# Patient Record
Sex: FEMALE | Employment: PART TIME | URBAN - METROPOLITAN AREA
[De-identification: names, ages, dates, MRNs, and addresses within clinical notes are randomized per-mention and may not be internally consistent; named-entity substitution may affect disease eponyms.]

---

## 2019-11-05 ENCOUNTER — APPOINTMENT (OUTPATIENT)
Dept: GENERAL RADIOLOGY | Age: 24
End: 2019-11-05
Payer: COMMERCIAL

## 2019-11-05 ENCOUNTER — HOSPITAL ENCOUNTER (OUTPATIENT)
Age: 24
Discharge: HOME | End: 2019-11-05
Payer: COMMERCIAL

## 2019-11-05 VITALS
DIASTOLIC BLOOD PRESSURE: 74 MMHG | HEART RATE: 88 BPM | SYSTOLIC BLOOD PRESSURE: 122 MMHG | OXYGEN SATURATION: 100 % | RESPIRATION RATE: 18 BRPM | TEMPERATURE: 97 F

## 2019-11-05 DIAGNOSIS — S92.505A CLOSED NONDISPLACED FRACTURE OF PHALANX OF LESSER TOE OF LEFT FOOT, UNSPECIFIED PHALANX, INITIAL ENCOUNTER: Primary | ICD-10-CM

## 2019-11-05 PROCEDURE — 28510 TREATMENT OF TOE FRACTURE: CPT

## 2019-11-05 PROCEDURE — 99203 OFFICE O/P NEW LOW 30 MIN: CPT

## 2019-11-05 PROCEDURE — 73660 X-RAY EXAM OF TOE(S): CPT

## 2019-11-05 NOTE — ED PROVIDER NOTES
Patient Seen in: [facility]      History   Patient presents with:  Lower Extremity Injury (musculoskeletal)    Stated Complaint: left little toe injury last night,painful    HPI  27year old female presents with left 5th digit p rhythm. Heart sounds: Normal heart sounds. Pulmonary:      Effort: Pulmonary effort is normal.      Breath sounds: Normal breath sounds. Musculoskeletal:      Left foot: Decreased range of motion (5th toe). Normal capillary refill.  Tenderness, bon Clinical Impression:  Closed nondisplaced fracture of phalanx of lesser toe of left foot, unspecified phalanx, initial encounter  (primary encounter diagnosis)    Disposition:  Discharge  11/5/2019  2:03 pm    Follow-up:  Sacha Rey MD  [address]

## 2019-11-06 NOTE — ED NOTES
Patient called and told that ortho-[facility] would not see her. D/w  and told patient to wear post-op shoe for at least two weeks and f/u with pcp for re-xray and clearance. Patient verbalized understanding.

## 2022-12-01 ENCOUNTER — OFFICE VISIT (OUTPATIENT)
Dept: FAMILY MEDICINE CLINIC | Facility: CLINIC | Age: 27
End: 2022-12-01
Payer: COMMERCIAL

## 2022-12-01 VITALS
RESPIRATION RATE: 20 BRPM | HEIGHT: 66 IN | BODY MASS INDEX: 45.54 KG/M2 | OXYGEN SATURATION: 100 % | HEART RATE: 86 BPM | TEMPERATURE: 97 F | DIASTOLIC BLOOD PRESSURE: 80 MMHG | WEIGHT: 283.38 LBS | SYSTOLIC BLOOD PRESSURE: 122 MMHG

## 2022-12-01 DIAGNOSIS — M79.671 HEEL PAIN, BILATERAL: ICD-10-CM

## 2022-12-01 DIAGNOSIS — R03.0 ELEVATED BLOOD PRESSURE READING WITHOUT DIAGNOSIS OF HYPERTENSION: ICD-10-CM

## 2022-12-01 DIAGNOSIS — Z13.0 SCREENING FOR DISORDER OF BLOOD AND BLOOD-FORMING ORGANS: ICD-10-CM

## 2022-12-01 DIAGNOSIS — Z13.29 SCREENING FOR ENDOCRINE DISORDER: ICD-10-CM

## 2022-12-01 DIAGNOSIS — Z13.6 SCREENING FOR HEART DISEASE: ICD-10-CM

## 2022-12-01 DIAGNOSIS — E55.9 VITAMIN D DEFICIENCY: ICD-10-CM

## 2022-12-01 DIAGNOSIS — Z28.21 IMMUNIZATION NOT CARRIED OUT BECAUSE OF PATIENT REFUSAL: ICD-10-CM

## 2022-12-01 DIAGNOSIS — E66.01 MORBID OBESITY WITH BMI OF 45.0-49.9, ADULT (HCC): ICD-10-CM

## 2022-12-01 DIAGNOSIS — Z00.00 ROUTINE GENERAL MEDICAL EXAMINATION AT A HEALTH CARE FACILITY: Primary | ICD-10-CM

## 2022-12-01 DIAGNOSIS — M79.672 HEEL PAIN, BILATERAL: ICD-10-CM

## 2022-12-01 DIAGNOSIS — Z13.1 SCREENING FOR DIABETES MELLITUS: ICD-10-CM

## 2022-12-01 PROCEDURE — 3008F BODY MASS INDEX DOCD: CPT | Performed by: FAMILY MEDICINE

## 2022-12-01 PROCEDURE — 99213 OFFICE O/P EST LOW 20 MIN: CPT | Performed by: FAMILY MEDICINE

## 2022-12-01 PROCEDURE — 3074F SYST BP LT 130 MM HG: CPT | Performed by: FAMILY MEDICINE

## 2022-12-01 PROCEDURE — 99385 PREV VISIT NEW AGE 18-39: CPT | Performed by: FAMILY MEDICINE

## 2022-12-01 PROCEDURE — 3079F DIAST BP 80-89 MM HG: CPT | Performed by: FAMILY MEDICINE

## 2022-12-01 NOTE — PATIENT INSTRUCTIONS
-Recommend for blood pressure monitor, [employer].  -Recommend less than 2300 of milligrams of sodium a day. -Start exercising.

## 2022-12-28 ENCOUNTER — NURSE ONLY (OUTPATIENT)
Dept: HEMATOLOGY/ONCOLOGY | Facility: HOSPITAL | Age: 27
End: 2022-12-28
Attending: GENETIC COUNSELOR, MS
Payer: COMMERCIAL

## 2022-12-28 ENCOUNTER — GENETICS ENCOUNTER (OUTPATIENT)
Dept: GENETICS | Facility: HOSPITAL | Age: 27
End: 2022-12-28
Attending: GENETIC COUNSELOR, MS
Payer: COMMERCIAL

## 2022-12-28 DIAGNOSIS — Z80.9 FAMILY HISTORY OF CANCER: ICD-10-CM

## 2022-12-28 DIAGNOSIS — Z80.0 FAMILY HISTORY OF PANCREATIC CANCER: Primary | ICD-10-CM

## 2022-12-28 PROCEDURE — 36415 COLL VENOUS BLD VENIPUNCTURE: CPT

## 2022-12-28 PROCEDURE — 96040 HC GENETIC COUNSELING EA 30 MIN: CPT | Performed by: GENETIC COUNSELOR, MS

## 2022-12-28 NOTE — PROGRESS NOTES
Patient Name: Jacob El  YOB: 1995  Date of Visit: 2022    Reason for visit: Ms. Hanna Mustafa was seen for the purposes of genetic counseling due to a family history of pancreatic cancer and other cancers. The purpose of this visit was to review information regarding genetic testing options for mutations in high penetrance cancer susceptibility genes. Referring Provider: Devonte Cohn MD    Medical History: Ms. Hanna Mustafa is a pleasant and generally healthy 24year old female presenting with no personal history of cancer. Ms. James Ryan denies ever having had breast imaging. Her last pap/pelvic exam was in 2022 which needs to be repeated dt unsatisfactory specimen. She has never had a colonoscopy. Ms. Hanna Mustafa achieved menarche at approximately 8years of age, is pre-menopausal, and has never been pregnant. Ms. Hanna Mustafa has a ~2-year history of oral contraceptive use and denies any fertility or hormone replacement use. Relevant Family History: Ms. Hanna Mustafa has 1 brother (22y) who is healthy. Her mother (55y) has no cancers. She has 2 maternal aunts (74y, ~72y) and 1 maternal uncle (~61y) with no cancer histories. Her 61-year-old maternal aunt has 2 daughters, 1 daughter had her toe amputated dt skin cancer at 28y. The 61-year-old aunt has 1 daughter and 3 sons, 1 son  at 86B dt complications from a failed stent placed due to congenital hydrocephalus, he was also born with a congenital heart defect that was surgically repaired. Ms. James Ryan maternal grandmother  at 66y dt dementia-related complications with no cancers. The maternal grandmother's mother had a h/o colon cancer dx at an unknown age. The maternal grandfather (80y) has a h/o bladder cancer dx at 48y and 2 occurrences of skin cancer dx >65y on his face, he has a h/o significant sun exposure working as a farmer.  The maternal grandfather had 1 brother who  at [facility] bone cancer dx at 70y and has 1 brother with a h/o prostate cancer dx in his 66-80s. Ms. Yelena Garza father  at 36y dt pancreatic cancer dx at 36y, he had no reported h/o pancreatitis and had a significant h/o smoking. Ms. Ashley Fournier has 2 paternal uncles (74y, 70y) with no current/past cancer dx, the 59-year-old uncle will be having a prostate biopsy in mid-2023, neither paternal uncle has biological children. Ms. Yelena Garza paternal grandmother (85y) has no cancers. The paternal grandfather  at [address] GE-junction carcinoma dx at 57y, he had a h/o smoking. The rest of the family history is negative for other significant genetic conditions, cancers, or birth defects of any kind. See scanned pedigree for full family history reported during the session. Ms. Yelena Garza maternal ethnicity is [place]/Australian and her paternal ethnicity is English/English. She is unaware of any Ashkenazi Adventism heritage. Summary: Hereditary/familial factors are believed to account for approximately 3-10% of cases of pancreatic cancer. Familial pancreatic cancer refers to families with at least two first-degree relatives with pancreatic cancer and no other features consistent with another hereditary syndrome. Signs of a hereditary cancer syndrome include rare cancers, common cancers occurring at unusually young ages, multiple primary cancers in the some individual, or the same type of cancer or related cancers (e.g., breast and ovarian, colorectal and endometrial) in three or more individuals in the same lineage. Hereditary syndromes associated with an increased risk for pancreatic cancer including BRCA2-related hereditary breast and ovarian cancer syndrome, Peutz-Jeghers syndrome, Familial Atypical Multiple Mole Melanoma Syndrome (FAMMM), Lucas syndrome, and hereditary pancreatitis. Other risk factors for pancreatic cancer include: a history of chronic pancreatitis, cigarette smoking, and adult-onset diabetes.        If testing is performed, three results are possible: positive, negative, and variant of uncertain significance. A positive result indicates a mutation has been identified, and there is an increased risk for the cancers associated with the specific gene. Since mutations in most cancer susceptibility genes are inherited in an autosomal dominant fashion, siblings and children of individuals with a mutation have a 50% risk of carrying the mutation as well. A negative test result would indicate that no mutation was identified in a cancer susceptibility gene. While testing detects gene mutations, it is possible for a mutation to be present and go undetected. It is also possible for a mutation to be located in a gene other than those being tested. A variant of uncertain significance means that a change has been identified a cancer susceptibility gene; however, it is uncertain if the variant is pathogenic or a non-deleterious change. With time, the variant may be reclassified as either positive or negative. Since mutation identification is necessary, an affected family member is preferred in order to confirm that a mutation is indeed present in a particular family. If the mutation can be identified in an affected individual, this information can be used to screen other at-risk individuals in the family. Individuals who are positive for the same mutation would be expected to have a much greater risk for developing hereditary cancer during their lifetime than the general population and surveillance and management would be rigorous. For those individuals who are found to not carry the mutation, risks for developing cancer during their lifetime would return to those expected for individuals in the general population. It should be emphasized that absence of a mutation in an at-risk individual would not eliminate their risk for cancer, but simply return them to the risk expected for the general population.  If no affected individual is available for testing, a negative result, while reassuring, cannot be completely informative of the familial cancer risk as it is unknown whether no mutation was found because the individual tested is truly negative for the familial mutation or whether the familial mutation was unable to be detected by the genetic testing method used. In such cases, screening and follow-up should be guided by personal and family history. It should be noted that no one under age 25 can have testing performed for mutations in high-penetrance cancer predisposition genes for adult-onset conditions. Ms. Mayur Morgan genetic information is protected under the Genetic Information Nondiscrimination Act of 2008 (CLARENCE). CLARENCE is a federal law protecting individuals from genetic discrimination in [facility] and most places of employment. CLARENCE protections against genetic discrimination do not apply to other forms of insurance such as life, long term care, disability, and [employer] [place]. Individuals without such policies in place may wish to consider doing so before proceeding with genetic testing, since their genetic information could potentially be used to inform decisions concerning eligibility, premiums, and coverage. Because Ms. Cutler's father  due to pancreatic cancer at 36y, genetic testing for mutations in high-penetrance cancer susceptibility genes is indicated based on the NCCN guidelines (HBOC/Pancreatic V.1.). Ms. Esvin Clark appeared to understand the information presented. On the day of the visit Ms. Esvin Clark elected to proceed with genetic testing for hereditary cancer syndromes. My office will call Ms. Esvin Clark as soon as results are received; post-test counseling can be scheduled at that time. Thank you for allowing me to participate in the care of your patient; please do not hesitate to contact my office if you have any questions or concerns, [phone]. Plan:   1.  Blood was drawn and sent out for [employer]s pancreatic cancer panel and common hereditary cancers panel (TAT: 2-3 weeks). 2. The Genetics office will call Ms. Chris Reno when results are available. 3. Recommendations for Ms. Chris Reno and family members will depend the above genetic testing results.       Send to: [facility]  Time spent with patient: 35 minutes

## 2023-01-17 ENCOUNTER — GENETICS ENCOUNTER (OUTPATIENT)
Dept: HEMATOLOGY/ONCOLOGY | Facility: HOSPITAL | Age: 28
End: 2023-01-17

## 2023-01-17 DIAGNOSIS — Z13.71 BRCA GENE MUTATION NEGATIVE IN FEMALE: Primary | ICD-10-CM

## 2023-01-17 NOTE — PROGRESS NOTES
Patient Name: Leta Gutierrez  YOB: 1995    Referring Provider:  Bao Ferguson MD      Reason for Referral:  Ms. Nai Matthews had genetic testing performed on 12/28/2022 because of a family history of pancreatic cancer and other cancers. Genetic Testing Result:  Negative. No pathogenic variant was found in the following 49 genes on the [employer] BRCA1/2 panel, pancreatic cancer panel, and common hereditary cancers panel: APC*, MIKEL*, AXIN2, BARD1, BMPR1A, BRCA1, BRCA2, BRIP1, CDH1, CDK4, CDKN2A (p14ARF), CDKN2A (p52QNK4v), CHEK2, CTNNA1, DICER1*, EPCAM*, FANCC, GREM1*, HOXB13, KIT, MEN1*, MLH1*, MSH2*, MSH3*, MSH6*, MUTYH, NBN, NF1*, NTHL1, PALB2, PALLD, PDGFRA, PMS2*, POLD1*, POLE, PTEN*, RAD50, RAD51C, RAD51D, SDHA*, SDHB, SDHC*, SDHD, SMAD4, SMARCA4, STK11, TP53, TSC1*, TSC2, VHL. Please refer to the report from [facility] for additional testing information. These results were discussed with Ms. Nai Matthews via telephone on 1/17/2023. Summary and Plan:   These results indicate it is unlikely that Ms. Nai Matthews has a pathogenic variant (harmful genetic mutation) in any of the genes listed above. No pathogenic variants associated with hereditary pancreatic cancer syndromes or other hereditary cancer syndromes were identified. The etiology Ms. Cutler's family history of cancer remains genetically unexplained. Medical management and surveillance for Ms. Nai Matthews and other family members should be based on their personal and family history. All medical management decisions should be made with a physician. The limitations of the testing were discussed with Ms. Cutler including the chance that a pathogenic variant in a gene other than those included in this analysis might be the cause of cancer in Ms. Nai Matthews or in relatives. I encouraged Ms. Cutler to share the genetic test results with her relatives so that they may discuss the implications of this information with their health providers. Ms. Stephanie Burnetts is also encouraged to contact me on an annual basis to learn if there have been any updates in genetic testing that would apply or if there are changes in the personal and/or family history. Please do not hesitate to contact my office if you have any questions or concerns, [phone].      Gabino Carvajal MS, CGC

## 2024-03-28 ENCOUNTER — OFFICE VISIT (OUTPATIENT)
Dept: FAMILY MEDICINE CLINIC | Facility: CLINIC | Age: 29
End: 2024-03-28
Payer: COMMERCIAL

## 2024-03-28 VITALS
TEMPERATURE: 98 F | WEIGHT: 291 LBS | BODY MASS INDEX: 46.22 KG/M2 | HEIGHT: 66.69 IN | SYSTOLIC BLOOD PRESSURE: 128 MMHG | OXYGEN SATURATION: 97 % | HEART RATE: 87 BPM | DIASTOLIC BLOOD PRESSURE: 86 MMHG

## 2024-03-28 DIAGNOSIS — Z00.00 ROUTINE GENERAL MEDICAL EXAMINATION AT A HEALTH CARE FACILITY: Primary | ICD-10-CM

## 2024-03-28 DIAGNOSIS — K62.5 RECTAL BLEEDING: ICD-10-CM

## 2024-03-28 DIAGNOSIS — E55.9 VITAMIN D DEFICIENCY: ICD-10-CM

## 2024-03-28 DIAGNOSIS — E66.01 MORBID OBESITY WITH BMI OF 45.0-49.9, ADULT (HCC): ICD-10-CM

## 2024-03-28 PROCEDURE — 96127 BRIEF EMOTIONAL/BEHAV ASSMT: CPT | Performed by: FAMILY MEDICINE

## 2024-03-28 PROCEDURE — 3008F BODY MASS INDEX DOCD: CPT | Performed by: FAMILY MEDICINE

## 2024-03-28 PROCEDURE — 3074F SYST BP LT 130 MM HG: CPT | Performed by: FAMILY MEDICINE

## 2024-03-28 PROCEDURE — 99213 OFFICE O/P EST LOW 20 MIN: CPT | Performed by: FAMILY MEDICINE

## 2024-03-28 PROCEDURE — 3079F DIAST BP 80-89 MM HG: CPT | Performed by: FAMILY MEDICINE

## 2024-03-28 PROCEDURE — 99395 PREV VISIT EST AGE 18-39: CPT | Performed by: FAMILY MEDICINE

## 2024-03-28 NOTE — PATIENT INSTRUCTIONS
-Prior to your follow-up appoint with Dr. Bain recommend reach out to your insurance to find out if they cover Zepbound, if they do not cover Zepbound asked them if they cover Wegovy.

## 2024-03-28 NOTE — PROGRESS NOTES
Chief Complaint   Patient presents with    Physical    Weight Problem     concern    Other     C/o of bowel issues and seeing blood in the toilet a few times over the past 3 years , and still sees its on the tissue when she wipes.         HPI:   Deidre Cutler is a 29 year old female       Rectal bleeding:  Over the last 6 years has rectal bleeding she notices with wiping.  But has had a few occasions with a great deal of blood in the toilet bowel.  Sometimes has to strain to have a bm.  States she has IBS with diarrhea and sometimes has constipation.  Has not had a colonoscopy.  She saw GI as a teenager and was put on medication around age 12-13, thinks she may have been on metformin at that time.      Obesity:  Would like help to lose weight.      No other known fam h/o pancreatic father, possible environmental exposure, was also a smoker.    Paternal grandfather had esophageal and  age 61 yo, he was a smoker.          Last PAP: Up to date, 2022  Abnormal PAP: per pt had an inconclusive pap in the past        Wt Readings from Last 6 Encounters:   24 289 lb (131.1 kg)   24 291 lb (132 kg)   22 283 lb 6.4 oz (128.5 kg)   12/28/15 258 lb (117 kg) (>99%, Z= 2.53)*   14 289 lb (131.1 kg) (>99%, Z= 2.67)*   12 247 lb (112 kg) (>99%, Z= 2.54)*     * Growth percentiles are based on CDC (Girls, 2-20 Years) data.     Body mass index is 46 kg/m².       Patient Active Problem List   Diagnosis    Insulin resistance    Elevated blood pressure reading without diagnosis of hypertension    Heel pain, bilateral    Morbid obesity with BMI of 45.0-49.9, adult (HCC)    BRCA gene mutation negative in female    Rectal bleeding    Vitamin D deficiency     Current Outpatient Medications   Medication Sig Dispense Refill             Past Medical History:    BRCA gene mutation negative in female    Negative 49 gene hereditary cancer panel, report in media tab      History reviewed. No pertinent  surgical history.   Family History   Problem Relation Age of Onset    Pancreatic Cancer Father 43    Dementia Maternal Grandmother     Cancer Maternal Grandfather 53        bladder cancer    Skin cancer Maternal Grandfather         x2 on face    Cancer Paternal Grandfather 61        GE-junction cancer    Skin cancer Maternal Cousin Female 34        on toe      Social History:   Social History     Socioeconomic History    Marital status: Single   Tobacco Use    Smoking status: Never     Passive exposure: Yes    Smokeless tobacco: Never   Vaping Use    Vaping status: Never Used   Substance and Sexual Activity    Alcohol use: Yes     Comment: 3 drinks per month    Drug use: No    Sexual activity: Not Currently   Other Topics Concern    Caffeine Concern Yes     Comment: 2 cans diet coke daily    Exercise No    Seat Belt Yes     Social Determinants of Health      [facility]    Housing Stability     Occ: marketing. Marital Status: single. Children: n/a.   Exercise: none.    Diet:  avoids soda, drinks water and coffee or matcha. Feels she eats \"healthier meals\"     REVIEW OF SYSTEMS:   GENERAL: Overall feels well  SKIN: denies any unusual skin lesions or rashes  EYES: denies vision changes  HEENT: denies upper respiratory symptoms  LUNGS: denies cough or shortness of breath with exertion  CHEST:  denies breast changes or pain  CARDIOVASCULAR: denies chest pain or tightness on exertion  VASCULAR: No lower extremity swelling  GI: As in HPI  : No complaint of urinary problems, vaginal discharge or discomfort  MUSCULOSKELETAL: denies joint pain   NEURO: denies headaches or dizziness  PSYCH: denies depression or anxiety  ENDOCRINE: No complaints of temperature intolerance, polyuria, or excessive sweating.  LYMPHATICS: No complaints of swollen glands      EXAM:   /86   Pulse 87   Temp 97.9 °F (36.6 °C) (Temporal)   Ht 5' 6.69\" (1.694 m)   Wt 291 lb  (132 kg)   LMP 03/12/2024 (Exact Date)   SpO2 97%   BMI 46.00 kg/m²   Body mass index is 46 kg/m².   GENERAL: NAD, Pleasant obese  female.  SKIN: No visible rashes or suspicious lesions.  HEENT: atraumatic, normocephalic, EACs and TMs clear normal bilaterally.  Nose: No nasal discharge.  OP: MMM.  Posteriorly no exudate or erythema.  EYES: PERRL, EOMI, sclera, conjunctiva are clear  NECK: supple, no adenopathy/thyromegaly/masses  LUNGS: Clear to auscultation bilateral, no rales, rhonchi or wheezing  CARDIO: RRR without murmur normal S1S2  ABD: Obese. Soft, non tender to palpation.  No masses, HSM, or pulsations appreciated.  MUSCULOSKELETAL: gait normal, no gross M/S defect.  EXTREMITIES: no clubbing, cyanosis, or edema  NEURO: Alert and oriented x3.  No gross motor or sensory deficit.  PSYCH: normal affect      Immunization History   Administered Date(s) Administered    Covid-19 Vaccine Pfizer 30 mcg/0.3 ml 01/14/2021, 02/04/2021    DTAP INFANRIX 07/17/1997    DTP 05/10/1997    DTP/HIB Combined 03/11/1997    HEP B 01/13/1997, 02/10/1997    Hib, Unspecified Formulation 05/10/1997, 07/17/1997    OPV 03/11/1997, 05/10/1997, 07/17/1997    TDAP 10/31/2022   Pended Date(s) Pended    Influenza Vaccine Refused 12/01/2022       DATA:    Diabetes:    Lab Results   Component Value Date    A1C 5.4 04/02/2024    A1C 5.3 09/06/2011     04/02/2024     09/06/2011     Lab Results   Component Value Date    CHOLEST 168 04/02/2024    CHOLEST 164 11/08/2012    CHOLEST 164 11/08/2012     Lab Results   Component Value Date    HDL 41 04/02/2024    HDL 34 (L) 11/08/2012    HDL 34 (L) 11/08/2012     Lab Results   Component Value Date     (H) 04/02/2024     (H) 11/08/2012     (H) 11/08/2012     Lab Results   Component Value Date    TRIG 73 04/02/2024    TRIG 116 (H) 11/08/2012    TRIG 116 (H) 11/08/2012     Lab Results   Component Value Date    AST 9 (L) 04/02/2024    AST 10 (L) 11/08/2012     AST 10 (L) 11/08/2012     Lab Results   Component Value Date    ALT 18 04/02/2024    ALT 17 11/08/2012    ALT 17 11/08/2012           Component      Latest Ref Rng 11/8/2012   VITAMIN D, 25-HYDROXY      30 - 100 ng/mL 18.8 (L)    VITAMIN D, 25-HYDROXY      30 - 100 ng/mL 18.8 (L)           ASSESSMENT AND PLAN:   Deidre Cutler is a 29 year old female who presents for a complete physical exam.        Encounter Diagnoses   Name Primary?    Routine general medical examination at a health care facility Yes    Rectal bleeding     Morbid obesity with BMI of 45.0-49.9, adult (HCC)     Vitamin D deficiency          1. Routine general medical examination at a health care facility  Patient provided handout on women's health and prevention.   Routine health profile labs pending.    - CBC With Differential With Platelet; Future  - Comp Metabolic Panel (14); Future  - Lipid Panel; Future  - Assay, Thyroid Stim Hormone; Future  - Free T4, (Free Thyroxine); Future    2. Rectal bleeding  Patient referred to Dr. Garvin for further evaluation and care.    - CBC With Differential With Platelet; Future  - Gastro Referral - In Network    3. Morbid obesity with BMI of 45.0-49.9, adult (HCC)  Recommend healthy diet including green leafy vegetables, fresh fruits and lean protein.  Aerobic exercise 30 minutes five days a week for cardiovascular fitness and 45-60 minutes 6-7 days a week for weight loss.   -Prior to your follow-up appoint with Dr. Bain recommend reach out to your insurance to find out if they cover Zepbound, if they do not cover Zepbound asked them if they cover Wegovy.    - Hemoglobin A1C [E]; Future  - DIETITIAN EDUCATION INITIAL, DIET (INTERNAL)    4. Vitamin D deficiency  Recommend reevaluate vitamin D level, recommendations pending results.    - Vitamin D [E]; Future          Orders Placed This Encounter   Procedures    Hemoglobin A1C [E]    CBC With Differential With Platelet    Comp Metabolic Panel (14)    Lipid  Panel    Assay, Thyroid Stim Hormone    Free T4, (Free Thyroxine)    Vitamin D [E]     Imaging & Consults:  GASTRO - INTERNAL  DIETITIAN EDUCATION INITIAL, DIET (INTERNAL)    Return in about 2 weeks (around 4/11/2024) for Discuss plan for attaining weight loss.

## 2024-04-02 ENCOUNTER — LAB ENCOUNTER (OUTPATIENT)
Dept: LAB | Age: 29
End: 2024-04-02
Attending: FAMILY MEDICINE
Payer: COMMERCIAL

## 2024-04-02 DIAGNOSIS — E55.9 VITAMIN D DEFICIENCY: ICD-10-CM

## 2024-04-02 DIAGNOSIS — E66.01 MORBID OBESITY WITH BMI OF 40.0-44.9, ADULT (HCC): ICD-10-CM

## 2024-04-02 DIAGNOSIS — Z00.00 ROUTINE GENERAL MEDICAL EXAMINATION AT A HEALTH CARE FACILITY: ICD-10-CM

## 2024-04-02 DIAGNOSIS — K62.5 RECTAL BLEEDING: ICD-10-CM

## 2024-04-02 LAB
ALBUMIN SERPL-MCNC: 3.8 G/DL (ref 3.4–5)
ALBUMIN/GLOB SERPL: 0.9 {RATIO} (ref 1–2)
ALP LIVER SERPL-CCNC: 63 U/L
ALT SERPL-CCNC: 18 U/L
ANION GAP SERPL CALC-SCNC: 4 MMOL/L (ref 0–18)
AST SERPL-CCNC: 9 U/L (ref 15–37)
BASOPHILS # BLD AUTO: 0.03 X10(3) UL (ref 0–0.2)
BASOPHILS NFR BLD AUTO: 0.4 %
BILIRUB SERPL-MCNC: 0.5 MG/DL (ref 0.1–2)
BUN BLD-MCNC: 13 MG/DL (ref 9–23)
CALCIUM BLD-MCNC: 9.4 MG/DL (ref 8.5–10.1)
CHLORIDE SERPL-SCNC: 108 MMOL/L (ref 98–112)
CHOLEST SERPL-MCNC: 168 MG/DL (ref ?–200)
CO2 SERPL-SCNC: 26 MMOL/L (ref 21–32)
CREAT BLD-MCNC: 0.82 MG/DL
EGFRCR SERPLBLD CKD-EPI 2021: 100 ML/MIN/1.73M2 (ref 60–?)
EOSINOPHIL # BLD AUTO: 0.07 X10(3) UL (ref 0–0.7)
EOSINOPHIL NFR BLD AUTO: 0.8 %
ERYTHROCYTE [DISTWIDTH] IN BLOOD BY AUTOMATED COUNT: 13.2 %
EST. AVERAGE GLUCOSE BLD GHB EST-MCNC: 108 MG/DL (ref 68–126)
FASTING PATIENT LIPID ANSWER: YES
FASTING STATUS PATIENT QL REPORTED: YES
GLOBULIN PLAS-MCNC: 4.1 G/DL (ref 2.8–4.4)
GLUCOSE BLD-MCNC: 101 MG/DL (ref 70–99)
HBA1C MFR BLD: 5.4 % (ref ?–5.7)
HCT VFR BLD AUTO: 41.8 %
HDLC SERPL-MCNC: 41 MG/DL (ref 40–59)
HGB BLD-MCNC: 13.7 G/DL
IMM GRANULOCYTES # BLD AUTO: 0.02 X10(3) UL (ref 0–1)
IMM GRANULOCYTES NFR BLD: 0.2 %
LDLC SERPL CALC-MCNC: 113 MG/DL (ref ?–100)
LYMPHOCYTES # BLD AUTO: 2.02 X10(3) UL (ref 1–4)
LYMPHOCYTES NFR BLD AUTO: 24.1 %
MCH RBC QN AUTO: 29.7 PG (ref 26–34)
MCHC RBC AUTO-ENTMCNC: 32.8 G/DL (ref 31–37)
MCV RBC AUTO: 90.7 FL
MONOCYTES # BLD AUTO: 0.67 X10(3) UL (ref 0.1–1)
MONOCYTES NFR BLD AUTO: 8 %
NEUTROPHILS # BLD AUTO: 5.58 X10 (3) UL (ref 1.5–7.7)
NEUTROPHILS # BLD AUTO: 5.58 X10(3) UL (ref 1.5–7.7)
NEUTROPHILS NFR BLD AUTO: 66.5 %
NONHDLC SERPL-MCNC: 127 MG/DL (ref ?–130)
OSMOLALITY SERPL CALC.SUM OF ELEC: 286 MOSM/KG (ref 275–295)
PLATELET # BLD AUTO: 515 10(3)UL (ref 150–450)
POTASSIUM SERPL-SCNC: 3.9 MMOL/L (ref 3.5–5.1)
PROT SERPL-MCNC: 7.9 G/DL (ref 6.4–8.2)
RBC # BLD AUTO: 4.61 X10(6)UL
SODIUM SERPL-SCNC: 138 MMOL/L (ref 136–145)
T4 FREE SERPL-MCNC: 1 NG/DL (ref 0.8–1.7)
TRIGL SERPL-MCNC: 73 MG/DL (ref 30–149)
TSI SER-ACNC: 0.81 MIU/ML (ref 0.36–3.74)
VIT D+METAB SERPL-MCNC: 49 NG/ML (ref 30–100)
VLDLC SERPL CALC-MCNC: 13 MG/DL (ref 0–30)
WBC # BLD AUTO: 8.4 X10(3) UL (ref 4–11)

## 2024-04-02 PROCEDURE — 83036 HEMOGLOBIN GLYCOSYLATED A1C: CPT | Performed by: FAMILY MEDICINE

## 2024-04-02 PROCEDURE — 82306 VITAMIN D 25 HYDROXY: CPT | Performed by: FAMILY MEDICINE

## 2024-04-02 PROCEDURE — 80061 LIPID PANEL: CPT | Performed by: FAMILY MEDICINE

## 2024-04-02 PROCEDURE — 84439 ASSAY OF FREE THYROXINE: CPT | Performed by: FAMILY MEDICINE

## 2024-04-02 PROCEDURE — 80050 GENERAL HEALTH PANEL: CPT | Performed by: FAMILY MEDICINE

## 2024-04-18 ENCOUNTER — OFFICE VISIT (OUTPATIENT)
Dept: FAMILY MEDICINE CLINIC | Facility: CLINIC | Age: 29
End: 2024-04-18
Payer: COMMERCIAL

## 2024-04-18 ENCOUNTER — PATIENT MESSAGE (OUTPATIENT)
Dept: FAMILY MEDICINE CLINIC | Facility: CLINIC | Age: 29
End: 2024-04-18

## 2024-04-18 VITALS
WEIGHT: 289 LBS | SYSTOLIC BLOOD PRESSURE: 134 MMHG | DIASTOLIC BLOOD PRESSURE: 82 MMHG | HEART RATE: 81 BPM | OXYGEN SATURATION: 97 % | BODY MASS INDEX: 45.36 KG/M2 | HEIGHT: 66.89 IN | TEMPERATURE: 98 F

## 2024-04-18 DIAGNOSIS — E66.01 MORBID OBESITY WITH BMI OF 40.0-44.9, ADULT (HCC): ICD-10-CM

## 2024-04-18 DIAGNOSIS — Z51.81 THERAPEUTIC DRUG MONITORING: Primary | ICD-10-CM

## 2024-04-18 DIAGNOSIS — Z71.3 ENCOUNTER FOR WEIGHT LOSS COUNSELING: ICD-10-CM

## 2024-04-18 DIAGNOSIS — Z76.89 ENCOUNTER FOR NEW MEDICATION PRESCRIPTION: ICD-10-CM

## 2024-04-18 PROCEDURE — 3075F SYST BP GE 130 - 139MM HG: CPT | Performed by: FAMILY MEDICINE

## 2024-04-18 PROCEDURE — 3008F BODY MASS INDEX DOCD: CPT | Performed by: FAMILY MEDICINE

## 2024-04-18 PROCEDURE — 3079F DIAST BP 80-89 MM HG: CPT | Performed by: FAMILY MEDICINE

## 2024-04-18 PROCEDURE — 99214 OFFICE O/P EST MOD 30 MIN: CPT | Performed by: FAMILY MEDICINE

## 2024-04-18 NOTE — PROGRESS NOTES
Deidre Cutler is a 29 year old female.     Chief Complaint   Patient presents with    Weight Management         HPI:       Obesity:  Patient reports difficulty losing weight.  Has been working on diet and exercise but continues to struggle.          Wt Readings from Last 6 Encounters:   04/18/24 289 lb (131.1 kg)   03/28/24 291 lb (132 kg)   12/01/22 283 lb 6.4 oz (128.5 kg)   12/28/15 258 lb (117 kg) (>99%, Z= 2.53)*   05/05/14 289 lb (131.1 kg) (>99%, Z= 2.67)*   12/03/12 247 lb (112 kg) (>99%, Z= 2.54)*     * Growth percentiles are based on CDC (Girls, 2-20 Years) data.      Body mass index is 45.41 kg/m².        Current Outpatient Medications   Medication Sig Dispense Refill    semaglutide-weight management 0.25 MG/0.5ML Subcutaneous Solution Auto-injector Inject 0.5 mL (0.25 mg total) into the skin every 7 days. BMI 45.41 2 mL 0      Past Medical History:    BRCA gene mutation negative in female    Negative 49 gene hereditary cancer panel, report in media tab      History reviewed. No pertinent surgical history.   Social History:    Social History     Socioeconomic History    Marital status: Single   Tobacco Use    Smoking status: Never     Passive exposure: Yes    Smokeless tobacco: Never   Vaping Use    Vaping status: Never Used   Substance and Sexual Activity    Alcohol use: Yes     Comment: 3 drinks per month    Drug use: No    Sexual activity: Not Currently   Other Topics Concern    Caffeine Concern Yes     Comment: 2 cans diet coke daily    Exercise No    Seat Belt Yes     Social Determinants of Health      Received [facility], [facility]    Housing Stability         Family History   Problem Relation Age of Onset    Pancreatic Cancer Father 43    Dementia Maternal Grandmother     Cancer Maternal Grandfather 53        bladder cancer    Skin cancer Maternal Grandfather         x2 on face    Cancer Paternal Grandfather 61        GE-junction cancer    Skin  cancer Maternal Cousin Female 34        on toe     REVIEW OF SYSTEMS:   GENERAL HEALTH: Overall feels well.    RESPIRATORY: Denies: KELLIE/KWOK  CARDIOVASCULAR: Denies CP/palpitations  VASCULAR: Denies LE edema  GI: Denies abdominal pain/nausea/vomiting/constipation/diarrhea  NEURO: denies headaches/dizziness  PSYCH: denies depression and anxiety    Immunization History   Administered Date(s) Administered    Covid-19 Vaccine Pfizer 30 mcg/0.3 ml 01/14/2021, 02/04/2021    DTAP INFANRIX 07/17/1997    DTP 05/10/1997    DTP/HIB Combined 03/11/1997    HEP B 01/13/1997, 02/10/1997    Hib, Unspecified Formulation 05/10/1997, 07/17/1997    OPV 03/11/1997, 05/10/1997, 07/17/1997    TDAP 10/31/2022   Pended Date(s) Pended    Influenza Vaccine Refused 12/01/2022       EXAM:   /82   Pulse 81   Temp 97.9 °F (36.6 °C) (Temporal)   Ht 5' 6.89\" (1.699 m)   Wt 289 lb (131.1 kg)   LMP 04/09/2024 (Exact Date)   SpO2 97%   BMI 45.41 kg/m²   GENERAL: NAD, pleasant not acutely ill-appearing obese female  SKIN: no visible rashes  HEAD: NCAT  EXTREMITIES: no cyanosis or clubbing  NEURO: Alert and Oriented x3.   PSYCH: Affect normal.  Normal thought content.        DATA:    Diabetes:    Lab Results   Component Value Date    A1C 5.4 04/02/2024    A1C 5.3 09/06/2011     04/02/2024     09/06/2011     Lab Results   Component Value Date    CHOLEST 168 04/02/2024    CHOLEST 164 11/08/2012    CHOLEST 164 11/08/2012     Lab Results   Component Value Date    HDL 41 04/02/2024    HDL 34 (L) 11/08/2012    HDL 34 (L) 11/08/2012     Lab Results   Component Value Date     (H) 04/02/2024     (H) 11/08/2012     (H) 11/08/2012     Lab Results   Component Value Date    TRIG 73 04/02/2024    TRIG 116 (H) 11/08/2012    TRIG 116 (H) 11/08/2012     Lab Results   Component Value Date    AST 9 (L) 04/02/2024    AST 10 (L) 11/08/2012    AST 10 (L) 11/08/2012     Lab Results   Component Value Date    ALT 18 04/02/2024     ALT 17 11/08/2012    ALT 17 11/08/2012     Thyroid:    Lab Results   Component Value Date    TSH 0.808 04/02/2024    TSH 1.200 11/08/2012    TSH 1.200 11/08/2012    T4F 1.0 04/02/2024    T4F 1.0 11/08/2012    T4F 1.0 11/08/2012           ASSESSMENT AND PLAN:       Encounter Diagnoses   Name Primary?    Therapeutic drug monitoring Yes    Encounter for weight loss counseling     Morbid obesity with BMI of 40.0-44.9, adult (Formerly [facility])     Encounter for new medication prescription        1. Therapeutic drug monitoring  2. Encounter for weight loss counseling  3. Morbid obesity with BMI of 40.0-44.9, adult (Formerly [facility])  Recommend trial of Wegovy.  Patient counseled on healthy diet, recommend Mediterranean diet, DASH diet, [place]nish diet, plant-based diet.  Recommend exercise at least 150 minutes/week.  If Wegovy is covered, patient to call let me know so we can put in the next 2 incremental doses.  If Wegovy is not covered by insurance recommend patient make appointment to see me to discuss other medications to help assist with losing weight.  Follow-up in 3 months, sooner if unable to obtain Wegovy.    - semaglutide-weight management 0.25 MG/0.5ML Subcutaneous Solution Auto-injector; Inject 0.5 mL (0.25 mg total) into the skin every 7 days. BMI 45.41  Dispense: 2 mL; Refill: 0    4. Encounter for new medication prescription  Medication use, risks, benefits, side effects and precautions discussed, patient verbalizes understanding. Questions encouraged and answered to patient's satisfaction.    - semaglutide-weight management 0.25 MG/0.5ML Subcutaneous Solution Auto-injector; Inject 0.5 mL (0.25 mg total) into the skin every 7 days. BMI 45.41  Dispense: 2 mL; Refill: 0            Meds & Refills for this Visit:  Requested Prescriptions     Signed Prescriptions Disp Refills    semaglutide-weight management 0.25 MG/0.5ML Subcutaneous Solution Auto-injector 2 mL 0     Sig: Inject 0.5 mL (0.25 mg total) into the skin every 7 days. BMI 45.41        Return in about 3 months (around 7/18/2024) for Progress on weight loss. Sooner if needed..

## 2024-04-21 PROBLEM — E55.9 VITAMIN D DEFICIENCY: Status: ACTIVE | Noted: 2024-04-21

## 2024-04-21 PROBLEM — K62.5 RECTAL BLEEDING: Status: ACTIVE | Noted: 2024-04-21

## 2024-04-24 ENCOUNTER — ORDER TRANSCRIPTION (OUTPATIENT)
Dept: ADMINISTRATIVE | Facility: HOSPITAL | Age: 29
End: 2024-04-24

## 2024-04-24 DIAGNOSIS — E66.01 MORBID OBESITY WITH BMI OF 45.0-49.9, ADULT (HCC): Primary | ICD-10-CM

## 2024-05-14 ENCOUNTER — PATIENT MESSAGE (OUTPATIENT)
Dept: FAMILY MEDICINE CLINIC | Facility: CLINIC | Age: 29
End: 2024-05-14

## 2024-05-14 ENCOUNTER — OFFICE VISIT (OUTPATIENT)
Dept: NUTRITION | Facility: HOSPITAL | Age: 29
End: 2024-05-14
Attending: FAMILY MEDICINE

## 2024-05-14 DIAGNOSIS — E66.01 MORBID OBESITY WITH BMI OF 40.0-44.9, ADULT (HCC): ICD-10-CM

## 2024-05-14 DIAGNOSIS — Z76.89 ENCOUNTER FOR NEW MEDICATION PRESCRIPTION: ICD-10-CM

## 2024-05-14 DIAGNOSIS — Z51.81 THERAPEUTIC DRUG MONITORING: ICD-10-CM

## 2024-05-14 DIAGNOSIS — Z71.3 ENCOUNTER FOR WEIGHT LOSS COUNSELING: ICD-10-CM

## 2024-05-14 PROCEDURE — 97802 MEDICAL NUTRITION INDIV IN: CPT

## 2024-05-14 NOTE — TELEPHONE ENCOUNTER
Patient asking for increase in dosage via a separate message    Requested Prescriptions     Pending Prescriptions Disp Refills    WEGOVY 0.25 MG/0.5ML Subcutaneous Solution Auto-injector [Pharmacy Med Name: WEGOVY 0.25 MG/0.5 ML PEN]  0     Sig: INJECT 0.25 MG INTO THE SKIN EVERY 7 DAYS     Last refill: 4/18/24    Last Appointment: 4/18/24    Next Appointment: 7/18/24

## 2024-05-14 NOTE — PROGRESS NOTES
ADULT INITIAL OUTPATIENT NUTRITION CONSULTATION    Nutrition Assessment    Medical Diagnosis: Obesity    PMH: noted    Client Hx: 29 year old female    Meds: MVI, Mg, Omega 3, Wegovy (3 weeks)    Labs: A1c: 5.4%, Glu: 101    ANTHROPOMETRICS:  Ht: 5'6\"  Wt: 289#    BMI: 45.41  AdjBW: 194#    Current Diet: Regular    Diet hx: Maryuri Garcia when younger    Food/Beverage Intake:   BREAKFAST: skip or breakfast sandwich (egg, sausage, cheese, tomato) or hash browns, coffee (cream, sugar, caramel) or dates or banana  LUNCH: leftovers, turkey meatballs and green beans, or egg or tuna salad or rice and beef and peppers  DINNER: salmon avocado sushi or chicken chili, or cauliflower crust pizza, rarely pasta  SNACKS: \"all flavors of chips\" [place] yogurt bars, fruit  BEVERAGES: water, Dr.Pepper 5-7x/wk, greens powder w water, OJ rarely    Meal pattern: 2 meals/d, 2 snacks/d    Number of meals/week eaten at restaurants: 3-4x    Alcohol Intake: 2-3x/month (was more when at college)    Diet Quality: Needs Improvement    Estimated nutrition needs: (to achieve wt loss): 1500 cals/d, 80-90 gm protein, <25 gm added sugars    Physical Activity: walking dog, 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    Nutrition Intervention/Education: Comprehensive nutrition education and evaluation provided for weight loss management. Pt reports losing then gaining weight back since end of HS til currently. Pt began Wegovy 3 weeks ago to aid with weight loss. Pt has family hx of DM, current A1C wnl. Discussed nutrient dense food choices, lean meats, whole grains, f&v, omega 3 rich foods and  low fat dairy. Encouraged small, frequent meals, monitoring macros and ingredient lists. Pt aware to avoid added sugars and limit dining out. Activity encouraged. Written materials were issued and explained, including snack ideas and recipes. All questions answered at this time.Pt has set goals to follow recommendations set today, to track intake using phone rosina, MFP, and to contact RD with further questions or concerns.           Monitoring/Evaluation:  Pt to follow with MD to monitor meds and weight, RD available prn, suggest follow up in 3 months.     Time spent with patient: 60 minutes    Thank you for the referral,    Kari Huang RD, LDN  [email or web address]  P: [phone]

## 2024-05-14 NOTE — TELEPHONE ENCOUNTER
From: Deidre Cutler  To: Neelam Bain  Sent: 5/14/2024 4:55 PM CDT  Subject: Wegovy refill question     Hi Dr. Bain, I called [facility] and submitted a refill request through the automated voice machine. It said the refill request would need to be approved through you. Can you please approve the refill when you get the chance? Also can you confirm that it is a higher dosage than what I was previously on?   Thanks!

## 2024-05-15 RX ORDER — SEMAGLUTIDE 0.25 MG/.5ML
0.25 INJECTION, SOLUTION SUBCUTANEOUS
Refills: 0 | OUTPATIENT
Start: 2024-05-15

## 2024-05-16 NOTE — TELEPHONE ENCOUNTER
Please inform patient the next 2 incremental doses of 0.5 mg and 1 mg has been sent to patient's pharmacy.  After completing 4 weeks of the 0.5 mg dose she can increase to the 1 mg dose.

## 2024-06-20 RX ORDER — SEMAGLUTIDE 0.5 MG/.5ML
INJECTION, SOLUTION SUBCUTANEOUS
Refills: 0 | OUTPATIENT
Start: 2024-06-20

## 2024-06-28 ENCOUNTER — PATIENT MESSAGE (OUTPATIENT)
Dept: FAMILY MEDICINE CLINIC | Facility: CLINIC | Age: 29
End: 2024-06-28

## 2024-06-28 NOTE — TELEPHONE ENCOUNTER
From: Deidre Cutler  To: Neelam Bain  Sent: 6/28/2024 1:53 PM CDT  Subject: Refill Request    Hi Dr. Currie, [facility] had attempted to refill my prescription of Wegovy but was denied and told me to reach out to you. I have 2 weeks left of the 1.0 dosage and I have a follow up visit with you on 7/18. Please let me know if you can refill the prescription. Thanks!

## 2024-07-18 ENCOUNTER — OFFICE VISIT (OUTPATIENT)
Dept: FAMILY MEDICINE CLINIC | Facility: CLINIC | Age: 29
End: 2024-07-18
Payer: COMMERCIAL

## 2024-07-18 VITALS
SYSTOLIC BLOOD PRESSURE: 122 MMHG | RESPIRATION RATE: 20 BRPM | HEART RATE: 78 BPM | WEIGHT: 268 LBS | TEMPERATURE: 98 F | BODY MASS INDEX: 42.06 KG/M2 | OXYGEN SATURATION: 97 % | HEIGHT: 66.89 IN | DIASTOLIC BLOOD PRESSURE: 76 MMHG

## 2024-07-18 DIAGNOSIS — Z71.3 ENCOUNTER FOR WEIGHT LOSS COUNSELING: ICD-10-CM

## 2024-07-18 DIAGNOSIS — E66.01 MORBID OBESITY WITH BMI OF 40.0-44.9, ADULT (HCC): ICD-10-CM

## 2024-07-18 DIAGNOSIS — Z79.899 ENCOUNTER FOR LONG-TERM CURRENT USE OF MEDICATION: ICD-10-CM

## 2024-07-18 DIAGNOSIS — Z51.81 THERAPEUTIC DRUG MONITORING: Primary | ICD-10-CM

## 2024-07-18 PROCEDURE — 3008F BODY MASS INDEX DOCD: CPT | Performed by: FAMILY MEDICINE

## 2024-07-18 PROCEDURE — 3074F SYST BP LT 130 MM HG: CPT | Performed by: FAMILY MEDICINE

## 2024-07-18 PROCEDURE — 99214 OFFICE O/P EST MOD 30 MIN: CPT | Performed by: FAMILY MEDICINE

## 2024-07-18 PROCEDURE — 3078F DIAST BP <80 MM HG: CPT | Performed by: FAMILY MEDICINE

## 2024-07-18 RX ORDER — POLYETHYLENE GLYCOL-3350 AND ELECTROLYTES 236; 6.74; 5.86; 2.97; 22.74 G/274.31G; G/274.31G; G/274.31G; G/274.31G; G/274.31G
4000 POWDER, FOR SOLUTION ORAL ONCE
COMMUNITY
Start: 2024-07-16

## 2024-07-18 NOTE — PATIENT INSTRUCTIONS
-Call to let Dr. Bain know how you are doing with the 2.4 mg dose of Wegovy after you have done a couple of injections of it.  -Be sure to complete the 4 weeks of the 1.7 mg dose of Wegovy before increasing to the 2.4 mg dose.

## 2024-07-18 NOTE — PROGRESS NOTES
Deidre Cutler is a 29 year old female.     Chief Complaint   Patient presents with    Weight Management         HPI:       Obesity:  This is patient's first follow-up office visit since initiation of Wegovy 4/18/2024.  Patient currently using 1.7 mg subcu q. 7 days of the Wegovy, 2 days ago was her first dose of the 1.7 mg of Wegovy.  She has had a 21 pound weight loss since initiation of the Wegovy.  Clothes/pants fitting better.  Having some nausea as side effect in the mornings, it goes away after 1-2 hours.  Diet:  watching diet more closely.  Exercise:  started walking over the last month.    Patient does her Wegovy injections on Tuesdays.          Wt Readings from Last 6 Encounters:   07/18/24 268 lb (121.6 kg)   07/16/24 270 lb (122.5 kg)   04/18/24 289 lb (131.1 kg)   03/28/24 291 lb (132 kg)   12/01/22 283 lb 6.4 oz (128.5 kg)   12/28/15 258 lb (117 kg) (>99%, Z= 2.53)*     * Growth percentiles are based on CDC (Girls, 2-20 Years) data.      Body mass index is 42.11 kg/m².        Current Outpatient Medications   Medication Sig Dispense Refill    semaglutide-weight management 2.4 MG/0.75ML Subcutaneous Solution Auto-injector Inject 0.75 mL (2.4 mg total) into the skin every 7 days. 3 mL 0    GAVILYTE-G 236 g Oral Recon Soln Take 4,000 mL by mouth once. (Patient not taking: Reported on 7/18/2024)        Past Medical History:    Anxiety    Blood in the stool    BRCA gene mutation negative in female    Negative 49 gene hereditary cancer panel, report in media tab    Constipation    Diarrhea, unspecified    Flatulence/gas pain/belching    History of depression      History reviewed. No pertinent surgical history.   Social History:    Social History     Socioeconomic History    Marital status: Single   Tobacco Use    Smoking status: Never     Passive exposure: Yes    Smokeless tobacco: Never   Vaping Use    Vaping status: Never Used   Substance and Sexual Activity    Alcohol use: Yes     Alcohol/week: 1.0  standard drink of alcohol     Types: 1 Standard drinks or equivalent per week     Comment: 3 drinks per month    Drug use: No    Sexual activity: Not Currently   Other Topics Concern    Caffeine Concern Yes     Comment: 2 cans diet coke daily    Exercise No    Seat Belt Yes     Social Determinants of Health      Received [facility], [facility]    Housing Stability         Family History   Problem Relation Age of Onset    Pancreatic Cancer Father 43    Diabetes Mother     Dementia Maternal Grandmother     Diabetes Maternal Grandfather     Cancer Maternal Grandfather 53        bladder cancer    Skin cancer Maternal Grandfather         x2 on face    Diabetes Paternal Grandfather     Cancer Paternal Grandfather 61        GE-junction cancer    Prostate Cancer Maternal Uncle     Skin cancer Maternal Cousin Female 34        on toe    Colon Cancer Other         maternal great grandmother     REVIEW OF SYSTEMS:   GENERAL HEALTH: Overall feels well.    SKIN: denies any unusual skin lesions or rashes   RESPIRATORY: Denies: KELLIE/KWOK  CARDIOVASCULAR: Denies CP/palpitations  GI: Denies abdominal pain/vomiting/blood in stool/black stool/bloating/constipation/diarrhea  : denies urinary symptoms  NEURO: denies headaches/dizziness  PSYCH: denies depression and anxiety    Immunization History   Administered Date(s) Administered    Covid-19 Vaccine Pfizer 30 mcg/0.3 ml 01/14/2021, 02/04/2021    DTAP INFANRIX 07/17/1997    DTP 05/10/1997    DTP/HIB Combined 03/11/1997    HEP B 01/13/1997, 02/10/1997    Hib, Unspecified Formulation 05/10/1997, 07/17/1997    OPV 03/11/1997, 05/10/1997, 07/17/1997    TDAP 10/31/2022   Pended Date(s) Pended    Influenza Vaccine Refused 12/01/2022       EXAM:   /76   Pulse 78   Temp 97.9 °F (36.6 °C) (Temporal)   Resp 20   Ht 5' 6.89\" (1.699 m)   Wt 268 lb (121.6 kg)   LMP 07/10/2024 (Exact Date)   SpO2 97%   BMI 42.11 kg/m²   GENERAL: NAD, pleasant  obese  female who is otherwise well appearing  SKIN: no visible rashes  HEAD: NCAT  LUNGS: CTA A/P no wheezes/ronchi/rales/crackles  CARDIO: RRR, +S1/S2, no mm  VASCULAR: No lower extremity edema  EXTREMITIES: no cyanosis or clubbing  NEURO: Alert and Oriented x3.  No gross motor or gross sensory abnormalities.  PSYCH: Affect normal.  Normal thought content.        DATA:      Lab Results   Component Value Date    A1C 5.4 04/02/2024    A1C 5.3 09/06/2011     04/02/2024     09/06/2011     Lab Results   Component Value Date    CHOLEST 168 04/02/2024    CHOLEST 164 11/08/2012    CHOLEST 164 11/08/2012     Lab Results   Component Value Date    HDL 41 04/02/2024    HDL 34 (L) 11/08/2012    HDL 34 (L) 11/08/2012     Lab Results   Component Value Date     (H) 04/02/2024     (H) 11/08/2012     (H) 11/08/2012     Lab Results   Component Value Date    TRIG 73 04/02/2024    TRIG 116 (H) 11/08/2012    TRIG 116 (H) 11/08/2012     Lab Results   Component Value Date    AST 9 (L) 04/02/2024    AST 10 (L) 11/08/2012    AST 10 (L) 11/08/2012     Lab Results   Component Value Date    ALT 18 04/02/2024    ALT 17 11/08/2012    ALT 17 11/08/2012           ASSESSMENT AND PLAN:       Encounter Diagnoses   Name Primary?    Therapeutic drug monitoring Yes    Encounter for weight loss counseling     Morbid obesity with BMI of 40.0-44.9, adult (Formerly [facility])     Encounter for long-term current use of medication        -Call to let Dr. Bain know how you are doing with the 2.4 mg dose of Wegovy after you have done a couple of injections of it.  -Be sure to complete the 4 weeks of the 1.7 mg dose of Wegovy before increasing to the 2.4 mg dose.    1. Therapeutic drug monitoring  2. Encounter for weight loss counseling  3. Morbid obesity with BMI of 40.0-44.9, adult (Formerly [facility])  This is patient's first follow-up office visit since initiation of Wegovy at approximately 3 months ago.  Patient has had a 21 pound weight  loss since using the Wegovy.  Recommend patient continue to work on dietary changes.  She just started exercising over the last month and will continue to work on intensity and frequency.  Recommend increase Wegovy to 2.4 mg subcu q. 7 days.  Patient to let me know how she is doing with the 2.4 mg dose after she has completed a couple of the doses, would then prescribe a 3-month supply.  Follow-up in 3 months, sooner if needed.    - semaglutide-weight management 2.4 MG/0.75ML Subcutaneous Solution Auto-injector; Inject 0.75 mL (2.4 mg total) into the skin every 7 days.  Dispense: 3 mL; Refill: 0    4. Encounter for long-term current use of medication  Medication use, risks, benefits, side effects and precautions discussed, patient verbalizes understanding. Questions encouraged and answered to patient's satisfaction.    - semaglutide-weight management 2.4 MG/0.75ML Subcutaneous Solution Auto-injector; Inject 0.75 mL (2.4 mg total) into the skin every 7 days.  Dispense: 3 mL; Refill: 0          Meds & Refills for this Visit:  Requested Prescriptions     Signed Prescriptions Disp Refills    semaglutide-weight management 2.4 MG/0.75ML Subcutaneous Solution Auto-injector 3 mL 0     Sig: Inject 0.75 mL (2.4 mg total) into the skin every 7 days.       Return in about 3 months (around 10/18/2024) for Progress on weight loss, sooner if needed.

## 2024-08-29 ENCOUNTER — PATIENT MESSAGE (OUTPATIENT)
Dept: FAMILY MEDICINE CLINIC | Facility: CLINIC | Age: 29
End: 2024-08-29

## 2024-08-30 NOTE — TELEPHONE ENCOUNTER
From: Deidre Cutler  To: Neelam Bain  Sent: 8/29/2024 4:55 PM CDT  Subject: TDAP Vaccine History    Hi Dr. Bain, do you by chance have history of my TDAP vaccine on file? I need to submit proof for school, so wanted to see if it was on file with your office!

## 2024-09-03 DIAGNOSIS — Z51.81 THERAPEUTIC DRUG MONITORING: ICD-10-CM

## 2024-09-03 DIAGNOSIS — E66.01 MORBID OBESITY WITH BMI OF 40.0-44.9, ADULT (HCC): ICD-10-CM

## 2024-09-03 DIAGNOSIS — Z79.899 ENCOUNTER FOR LONG-TERM CURRENT USE OF MEDICATION: ICD-10-CM

## 2024-09-03 DIAGNOSIS — Z71.3 ENCOUNTER FOR WEIGHT LOSS COUNSELING: ICD-10-CM

## 2024-09-28 DIAGNOSIS — Z79.899 ENCOUNTER FOR LONG-TERM CURRENT USE OF MEDICATION: ICD-10-CM

## 2024-09-28 DIAGNOSIS — E66.01 MORBID OBESITY WITH BMI OF 40.0-44.9, ADULT (HCC): ICD-10-CM

## 2024-09-28 DIAGNOSIS — Z71.3 ENCOUNTER FOR WEIGHT LOSS COUNSELING: ICD-10-CM

## 2024-09-28 DIAGNOSIS — Z51.81 THERAPEUTIC DRUG MONITORING: ICD-10-CM

## 2024-11-01 ENCOUNTER — OFFICE VISIT (OUTPATIENT)
Dept: FAMILY MEDICINE CLINIC | Facility: CLINIC | Age: 29
End: 2024-11-01
Payer: COMMERCIAL

## 2024-11-01 VITALS
WEIGHT: 247 LBS | SYSTOLIC BLOOD PRESSURE: 118 MMHG | DIASTOLIC BLOOD PRESSURE: 76 MMHG | OXYGEN SATURATION: 98 % | TEMPERATURE: 98 F | BODY MASS INDEX: 39.23 KG/M2 | HEART RATE: 80 BPM | RESPIRATION RATE: 16 BRPM | HEIGHT: 66.5 IN

## 2024-11-01 DIAGNOSIS — Z71.3 ENCOUNTER FOR WEIGHT LOSS COUNSELING: ICD-10-CM

## 2024-11-01 DIAGNOSIS — Z28.21 REFUSED INFLUENZA VACCINE: ICD-10-CM

## 2024-11-01 DIAGNOSIS — Z79.899 ENCOUNTER FOR LONG-TERM CURRENT USE OF MEDICATION: ICD-10-CM

## 2024-11-01 DIAGNOSIS — E66.09 CLASS 2 OBESITY DUE TO EXCESS CALORIES WITH BODY MASS INDEX (BMI) OF 39.0 TO 39.9 IN ADULT, UNSPECIFIED WHETHER SERIOUS COMORBIDITY PRESENT: ICD-10-CM

## 2024-11-01 DIAGNOSIS — Z51.81 THERAPEUTIC DRUG MONITORING: Primary | ICD-10-CM

## 2024-11-01 DIAGNOSIS — E66.812 CLASS 2 OBESITY DUE TO EXCESS CALORIES WITH BODY MASS INDEX (BMI) OF 39.0 TO 39.9 IN ADULT, UNSPECIFIED WHETHER SERIOUS COMORBIDITY PRESENT: ICD-10-CM

## 2024-11-01 PROCEDURE — 3074F SYST BP LT 130 MM HG: CPT | Performed by: FAMILY MEDICINE

## 2024-11-01 PROCEDURE — 3078F DIAST BP <80 MM HG: CPT | Performed by: FAMILY MEDICINE

## 2024-11-01 PROCEDURE — 3008F BODY MASS INDEX DOCD: CPT | Performed by: FAMILY MEDICINE

## 2024-11-01 PROCEDURE — 99214 OFFICE O/P EST MOD 30 MIN: CPT | Performed by: FAMILY MEDICINE

## 2024-11-01 NOTE — PROGRESS NOTES
Deidre Cutler is a 29 year old female.     Chief Complaint   Patient presents with    Other     Weight Management   Starting Weight 03/28/24 291lbs  Today's 11/01/2024 Weight 247lbs   Total Lost 44lbs     Weight Management    Obesity         HPI:       Obesity:  Patient using Wegovy 2.4 mg subcu q. 7 days.  Tolerating Wegovy well.  Starting date and weight and BMI: 3/28/2024: 291 pounds/BMI 46.  3-1/2-month interval weight loss: 21 pounds  Weight loss to date: 44 pounds.  Current BMI: 39.27.  Diet:  drinking a great deal of water, cut out soda for the most part, may take a couple of sips.  Eating more chicken.  Eating more veggies and fruits.  Exercise:  walking uses [employer].      Personal goal weight:  180-190, or around 185.        Wt Readings from Last 6 Encounters:   11/01/24 247 lb (112 kg)   07/18/24 268 lb (121.6 kg)   07/16/24 270 lb (122.5 kg)   04/18/24 289 lb (131.1 kg)   03/28/24 291 lb (132 kg)   12/01/22 283 lb 6.4 oz (128.5 kg)      Body mass index is 39.27 kg/m².        Current Outpatient Medications   Medication Sig Dispense Refill    propranolol 10 MG Oral Tab  (Patient taking differently: Take 1 tablet (10 mg total) by mouth as needed (as needed for anxiety).)      semaglutide-weight management 2.4 MG/0.75ML Subcutaneous Solution Auto-injector Inject 0.75 mL (2.4 mg total) into the skin every 7 days. 9 mL 0    busPIRone 5 MG Oral Tab  (Patient not taking: Reported on 11/1/2024)        Past Medical History:    Anxiety    Blood in the stool    BRCA gene mutation negative in female    Negative 49 gene hereditary cancer panel, report in media tab    Constipation    Diarrhea, unspecified    Flatulence/gas pain/belching    History of depression      History reviewed. No pertinent surgical history.   Social History:    Social History     Socioeconomic History    Marital status: Single   Tobacco Use    Smoking status: Never     Passive exposure: Yes    Smokeless tobacco: Never   Vaping Use    Vaping  status: Never Used   Substance and Sexual Activity    Alcohol use: Yes     Alcohol/week: 1.0 standard drink of alcohol     Types: 1 Standard drinks or equivalent per week     Comment: 3 drinks per month    Drug use: No    Sexual activity: Not Currently   Other Topics Concern     Service No    Blood Transfusions No    Caffeine Concern Yes     Comment: 2 cans diet coke daily    Occupational Exposure No    Hobby Hazards No    Sleep Concern No    Stress Concern No    Weight Concern No    Special Diet No    Back Care No    Exercise No    Bike Helmet No    Seat Belt Yes    Self-Exams No     Social Drivers of Health      Received from [facility], [facility]    Housing Stability         Family History   Problem Relation Age of Onset    Pancreatic Cancer Father 43    Diabetes Mother     Dementia Maternal Grandmother     Diabetes Maternal Grandfather     Cancer Maternal Grandfather 53        bladder cancer    Skin cancer Maternal Grandfather         x2 on face    Diabetes Paternal Grandfather     Cancer Paternal Grandfather 61        GE-junction cancer    Prostate Cancer Maternal Uncle     Skin cancer Maternal Cousin Female 34        on toe    Colon Cancer Other         maternal great grandmother     REVIEW OF SYSTEMS:   GENERAL HEALTH: Overall feels well.   SKIN: denies any unusual skin lesions or rashes   RESPIRATORY: Denies: KELLIE/KWOK  CARDIOVASCULAR: Denies CP/palpitations  VASCULAR: Denies LE edema  GI: Denies abdominal pain/vomiting/blood in stool/black stool/bloating/constipation/diarrhea  NEURO: denies headaches/dizziness  PSYCH: denies depression and anxiety    Immunization History   Administered Date(s) Administered    Covid-19 Vaccine Pfizer 30 mcg/0.3 ml 01/14/2021, 02/04/2021    DTAP 07/17/1997    DTP 05/10/1997    DTP/HIB Combined 03/11/1997    HEP B 01/13/1997, 02/10/1997    Hib, Unspecified Formulation 05/10/1997, 07/17/1997    OPV 03/11/1997, 05/10/1997,  07/17/1997    TDAP 10/31/2022   Pended Date(s) Pended    Influenza Vaccine Refused 12/01/2022, 11/01/2024       EXAM:   /76 (BP Location: Left arm, Patient Position: Sitting, Cuff Size: adult)   Pulse 80   Temp 97.9 °F (36.6 °C) (Temporal)   Resp 16   Ht 5' 6.5\" (1.689 m)   Wt 247 lb (112 kg)   LMP 10/10/2024 (Exact Date)   SpO2 98%   BMI 39.27 kg/m²   GENERAL: NAD, pleasant obese  female  SKIN: no visible rashes  HEAD: NCAT  LUNGS: CTA A/P no wheezes/ronchi/rales/crackles  CARDIO: RRR, +S1/S2, no mm  VASCULAR: No lower extremity edema  EXTREMITIES: no cyanosis or clubbing  NEURO: Alert and Oriented x3.  No gross motor or gross sensory abnormalities.  PSYCH: Affect normal.  Normal thought content.        DATA:      Lab Results   Component Value Date    A1C 5.4 04/02/2024    A1C 5.3 09/06/2011     04/02/2024     09/06/2011     Lab Results   Component Value Date    CHOLEST 168 04/02/2024    CHOLEST 164 11/08/2012    CHOLEST 164 11/08/2012     Lab Results   Component Value Date    HDL 41 04/02/2024    HDL 34 (L) 11/08/2012    HDL 34 (L) 11/08/2012     Lab Results   Component Value Date     (H) 04/02/2024     (H) 11/08/2012     (H) 11/08/2012     Lab Results   Component Value Date    TRIG 73 04/02/2024    TRIG 116 (H) 11/08/2012    TRIG 116 (H) 11/08/2012     Lab Results   Component Value Date    AST 9 (L) 04/02/2024    AST 10 (L) 11/08/2012    AST 10 (L) 11/08/2012     Lab Results   Component Value Date    ALT 18 04/02/2024    ALT 17 11/08/2012    ALT 17 11/08/2012           ASSESSMENT AND PLAN:       Encounter Diagnoses   Name Primary?    Therapeutic drug monitoring Yes    Encounter for weight loss counseling     Class 2 obesity due to excess calories with body mass index (BMI) of 39.0 to 39.9 in adult, unspecified whether serious comorbidity present     Encounter for long-term current use of medication     Refused influenza vaccine            1. Therapeutic  drug monitoring  2. Encounter for weight loss counseling  3. Class 2 obesity due to excess calories with body mass index (BMI) of 39.0 to 39.9 in adult, unspecified whether serious comorbidity present  Starting date and weight and BMI: 3/28/2024: 291 pounds/BMI 46.  3-1/2-month interval weight loss: 21 pounds  Weight loss to date: 44 pounds.  Current BMI: 39.27.  Recommend continue Wegovy 2.4 mg subcu q. 7 days.  Continue to work on diet and exercise.  Personal goal weight:  180-190, or around 185.  Follow-up in 3 months.    - semaglutide-weight management 2.4 MG/0.75ML Subcutaneous Solution Auto-injector; Inject 0.75 mL (2.4 mg total) into the skin every 7 days.  Dispense: 9 mL; Refill: 0    4. Encounter for long-term current use of medication  Medication use, risks, benefits, side effects and precautions discussed, patient verbalizes understanding. Questions encouraged and answered to patient's satisfaction.    - semaglutide-weight management 2.4 MG/0.75ML Subcutaneous Solution Auto-injector; Inject 0.75 mL (2.4 mg total) into the skin every 7 days.  Dispense: 9 mL; Refill: 0    5. Refused influenza vaccine  - Influenza Refused          Orders Placed This Encounter   Procedures    Influenza Refused       Meds & Refills for this Visit:  Requested Prescriptions     Signed Prescriptions Disp Refills    semaglutide-weight management 2.4 MG/0.75ML Subcutaneous Solution Auto-injector 9 mL 0     Sig: Inject 0.75 mL (2.4 mg total) into the skin every 7 days.       Imaging & Consults:  INFLUENZA REFUSED [facility]    Return in about 3 months (around 2/1/2025) for Progress on weight loss. Sooner.

## 2024-11-05 ENCOUNTER — TELEPHONE (OUTPATIENT)
Dept: FAMILY MEDICINE CLINIC | Facility: CLINIC | Age: 29
End: 2024-11-05

## 2024-11-05 NOTE — TELEPHONE ENCOUNTER
Prior authorization approved  Payer: [employer] Case ID: [id number]    [phone]    [phone]  Note from payer: Your PA request has been approved.  Additional information will be provided in the approval communication. (Message 1146)  Approval Details    Authorized from November 5, 2024 to November 5, 2025  Electronic appeal: Not supported  View History    Wegovy 2.4mg

## 2024-12-05 DIAGNOSIS — Z71.3 ENCOUNTER FOR WEIGHT LOSS COUNSELING: ICD-10-CM

## 2024-12-05 DIAGNOSIS — E66.09 CLASS 2 OBESITY DUE TO EXCESS CALORIES WITH BODY MASS INDEX (BMI) OF 39.0 TO 39.9 IN ADULT, UNSPECIFIED WHETHER SERIOUS COMORBIDITY PRESENT: ICD-10-CM

## 2024-12-05 DIAGNOSIS — Z51.81 THERAPEUTIC DRUG MONITORING: ICD-10-CM

## 2024-12-05 DIAGNOSIS — Z79.899 ENCOUNTER FOR LONG-TERM CURRENT USE OF MEDICATION: ICD-10-CM

## 2024-12-05 DIAGNOSIS — E66.812 CLASS 2 OBESITY DUE TO EXCESS CALORIES WITH BODY MASS INDEX (BMI) OF 39.0 TO 39.9 IN ADULT, UNSPECIFIED WHETHER SERIOUS COMORBIDITY PRESENT: ICD-10-CM

## 2024-12-27 ENCOUNTER — PATIENT MESSAGE (OUTPATIENT)
Dept: FAMILY MEDICINE CLINIC | Facility: CLINIC | Age: 29
End: 2024-12-27

## 2025-01-21 DIAGNOSIS — E66.812 CLASS 2 OBESITY DUE TO EXCESS CALORIES WITH BODY MASS INDEX (BMI) OF 39.0 TO 39.9 IN ADULT, UNSPECIFIED WHETHER SERIOUS COMORBIDITY PRESENT: ICD-10-CM

## 2025-01-21 DIAGNOSIS — E66.09 CLASS 2 OBESITY DUE TO EXCESS CALORIES WITH BODY MASS INDEX (BMI) OF 39.0 TO 39.9 IN ADULT, UNSPECIFIED WHETHER SERIOUS COMORBIDITY PRESENT: ICD-10-CM

## 2025-01-21 DIAGNOSIS — Z79.899 ENCOUNTER FOR LONG-TERM CURRENT USE OF MEDICATION: ICD-10-CM

## 2025-01-21 DIAGNOSIS — Z71.3 ENCOUNTER FOR WEIGHT LOSS COUNSELING: ICD-10-CM

## 2025-01-21 DIAGNOSIS — Z51.81 THERAPEUTIC DRUG MONITORING: ICD-10-CM

## 2025-02-21 ENCOUNTER — OFFICE VISIT (OUTPATIENT)
Dept: FAMILY MEDICINE CLINIC | Facility: CLINIC | Age: 30
End: 2025-02-21
Payer: COMMERCIAL

## 2025-02-21 VITALS
OXYGEN SATURATION: 98 % | RESPIRATION RATE: 21 BRPM | HEART RATE: 76 BPM | TEMPERATURE: 98 F | DIASTOLIC BLOOD PRESSURE: 82 MMHG | BODY MASS INDEX: 35.73 KG/M2 | WEIGHT: 225 LBS | SYSTOLIC BLOOD PRESSURE: 122 MMHG | HEIGHT: 66.5 IN

## 2025-02-21 DIAGNOSIS — Z51.81 THERAPEUTIC DRUG MONITORING: Primary | ICD-10-CM

## 2025-02-21 DIAGNOSIS — E66.09 CLASS 2 OBESITY DUE TO EXCESS CALORIES WITH BODY MASS INDEX (BMI) OF 35.0 TO 35.9 IN ADULT, UNSPECIFIED WHETHER SERIOUS COMORBIDITY PRESENT: ICD-10-CM

## 2025-02-21 DIAGNOSIS — Z51.81 THERAPEUTIC DRUG MONITORING: ICD-10-CM

## 2025-02-21 DIAGNOSIS — E66.09 CLASS 2 OBESITY DUE TO EXCESS CALORIES WITH BODY MASS INDEX (BMI) OF 39.0 TO 39.9 IN ADULT, UNSPECIFIED WHETHER SERIOUS COMORBIDITY PRESENT: ICD-10-CM

## 2025-02-21 DIAGNOSIS — E66.812 CLASS 2 OBESITY DUE TO EXCESS CALORIES WITH BODY MASS INDEX (BMI) OF 35.0 TO 35.9 IN ADULT, UNSPECIFIED WHETHER SERIOUS COMORBIDITY PRESENT: ICD-10-CM

## 2025-02-21 DIAGNOSIS — E66.812 CLASS 2 OBESITY DUE TO EXCESS CALORIES WITH BODY MASS INDEX (BMI) OF 39.0 TO 39.9 IN ADULT, UNSPECIFIED WHETHER SERIOUS COMORBIDITY PRESENT: ICD-10-CM

## 2025-02-21 DIAGNOSIS — Z79.899 ENCOUNTER FOR LONG-TERM CURRENT USE OF MEDICATION: ICD-10-CM

## 2025-02-21 DIAGNOSIS — Z71.3 ENCOUNTER FOR WEIGHT LOSS COUNSELING: ICD-10-CM

## 2025-02-21 PROCEDURE — 99213 OFFICE O/P EST LOW 20 MIN: CPT | Performed by: FAMILY MEDICINE

## 2025-02-21 RX ORDER — SEMAGLUTIDE 2.4 MG/.75ML
INJECTION, SOLUTION SUBCUTANEOUS
Qty: 3 ML | Refills: 0 | OUTPATIENT
Start: 2025-02-21

## 2025-02-21 NOTE — TELEPHONE ENCOUNTER
Requested Prescriptions     Pending Prescriptions Disp Refills    WEGOVY 2.4 MG/0.75ML Subcutaneous Solution Auto-injector [Pharmacy Med Name: WEGOVY 2.4MG/0.75ML INJ (4 PENS)] 3 mL 0     Sig: ADMINISTER 2.4 MG UNDER THE SKIN EVERY 7 DAYS       Last Refill: 1/22/25    Last OV: 11/1/24    Please review and advise.

## 2025-02-21 NOTE — PROGRESS NOTES
Deidre Cutler is a 30 year old female.     Chief Complaint   Patient presents with    Weight Management         HPI:         Obesity:  In the last 2-3 months no side effects from the Wegovy, previously had nausea in the mornings for 2 days after the injection.  Occ constipation the last couple of months.  She reports she is drinking at least 64 oz of water per day.  Exercise:  walking      Goal weight per pt she desires around 185#.            Wt Readings from Last 6 Encounters:   02/21/25 225 lb (102.1 kg)   11/01/24 247 lb (112 kg)   07/18/24 268 lb (121.6 kg)   07/16/24 270 lb (122.5 kg)   04/18/24 289 lb (131.1 kg)   03/28/24 291 lb (132 kg)      Body mass index is 35.77 kg/m².        Current Outpatient Medications   Medication Sig Dispense Refill    semaglutide-weight management 2.4 MG/0.75ML Subcutaneous Solution Auto-injector Inject 0.75 mL (2.4 mg total) into the skin every 7 days. 9 mL 0    propranolol 10 MG Oral Tab  (Patient taking differently: Take 1 tablet (10 mg total) by mouth as needed (as needed for anxiety).)        Past Medical History:    Anxiety    Blood in the stool    BRCA gene mutation negative in female    Negative 49 gene hereditary cancer panel, report in media tab    Constipation    Diarrhea, unspecified    Flatulence/gas pain/belching    History of depression      History reviewed. No pertinent surgical history.   Social History:    Social History     Socioeconomic History    Marital status: Single   Tobacco Use    Smoking status: Never     Passive exposure: Yes    Smokeless tobacco: Never   Vaping Use    Vaping status: Never Used   Substance and Sexual Activity    Alcohol use: Yes     Alcohol/week: 1.0 standard drink of alcohol     Types: 1 Standard drinks or equivalent per week     Comment: 3 drinks per month    Drug use: No    Sexual activity: Not Currently   Other Topics Concern     Service No    Blood Transfusions No    Caffeine Concern Yes     Comment: 2 cans diet coke  daily    Occupational Exposure No    Hobby Hazards No    Sleep Concern No    Stress Concern No    Weight Concern No    Special Diet No    Back Care No    Exercise No    Bike Helmet No    Seat Belt Yes    Self-Exams No     Social Drivers of Health      Received from [facility], [facility]    Housing Stability         Family History   Problem Relation Age of Onset    Pancreatic Cancer Father 43    Diabetes Mother     Dementia Maternal Grandmother     Diabetes Maternal Grandfather     Cancer Maternal Grandfather 53        bladder cancer    Skin cancer Maternal Grandfather         x2 on face    Diabetes Paternal Grandfather     Cancer Paternal Grandfather 61        GE-junction cancer    Prostate Cancer Maternal Uncle     Skin cancer Maternal Cousin Female 34        on toe    Colon Cancer Other         maternal great grandmother     REVIEW OF SYSTEMS:   GENERAL HEALTH: Overall feels well.   INTEGUMENT: denies any unusual skin lesions or rashes   RESPIRATORY: Denies: KELLIE/KWOK  CARDIOVASCULAR: Denies CP/palpitations  VASCULAR: Denies LE edema  GI: Denies abdominal pain/nausea/vomiting/diarrhea  NEURO: denies headaches/dizziness  PSYCH: denies depression and anxiety    Immunization History   Administered Date(s) Administered    Covid-19 Vaccine Pfizer 30 mcg/0.3 ml 01/14/2021, 02/04/2021    DTAP 07/17/1997    DTP 05/10/1997    DTP/HIB Combined 03/11/1997    HEP B 01/13/1997, 02/10/1997    Hib, Unspecified Formulation 05/10/1997, 07/17/1997    OPV 03/11/1997, 05/10/1997, 07/17/1997    TDAP 10/31/2022   Pended Date(s) Pended    Influenza Vaccine Refused 12/01/2022, 11/01/2024       EXAM:   /82   Pulse 76   Temp 97.6 °F (36.4 °C) (Temporal)   Resp 21   Ht 5' 6.5\" (1.689 m)   Wt 225 lb (102.1 kg)   LMP 02/02/2025 (Exact Date)   SpO2 98%   BMI 35.77 kg/m²   GENERAL: NAD, pleasant well-appearing obese female  INTEGUMENT: no visible rashes  HEAD: NCAT  NEURO: Alert and Oriented  x3.  No gross motor or gross sensory abnormalities.  PSYCH: Affect normal.  Normal thought content.        DATA:    Lab Results   Component Value Date    A1C 5.4 04/02/2024    A1C 5.3 09/06/2011     04/02/2024     09/06/2011     Lab Results   Component Value Date    CHOLEST 168 04/02/2024    CHOLEST 164 11/08/2012    CHOLEST 164 11/08/2012     Lab Results   Component Value Date    HDL 41 04/02/2024    HDL 34 (L) 11/08/2012    HDL 34 (L) 11/08/2012     Lab Results   Component Value Date     (H) 04/02/2024     (H) 11/08/2012     (H) 11/08/2012     Lab Results   Component Value Date    TRIG 73 04/02/2024    TRIG 116 (H) 11/08/2012    TRIG 116 (H) 11/08/2012     Lab Results   Component Value Date    AST 9 (L) 04/02/2024    AST 10 (L) 11/08/2012    AST 10 (L) 11/08/2012     Lab Results   Component Value Date    ALT 18 04/02/2024    ALT 17 11/08/2012    ALT 17 11/08/2012           ASSESSMENT AND PLAN:       Encounter Diagnoses   Name Primary?    Therapeutic drug monitoring Yes    Encounter for weight loss counseling     Class 2 obesity due to excess calories with body mass index (BMI) of 35.0 to 35.9 in adult, unspecified whether serious comorbidity present     Encounter for long-term current use of medication        Patient states she will be going to [place] in July and may need to be off of the Wegovy for 3 months.      1. Therapeutic drug monitoring  2. Encounter for weight loss counseling  3. Class 2 obesity due to excess calories with body mass index (BMI) of 35.0 to 35.9 in adult, unspecified whether serious comorbidity present  Patient with significant weight loss since using Wegovy.  Start date 3/28/2024 with BMI of 46.  Start weight: 291.  Today's weight: 225  3-month interval weight loss of 25 pounds.  Weight loss to date: 66 pounds.  BMI today: 35.    Recommend continue with therapeutic lifestyle changes.  Patient encouraged to start strength training in addition to  walking.    - semaglutide-weight management 2.4 MG/0.75ML Subcutaneous Solution Auto-injector; Inject 0.75 mL (2.4 mg total) into the skin every 7 days.  Dispense: 9 mL; Refill: 0    4. Encounter for long-term current use of medication  Medication use, risks, benefits, side effects and precautions discussed, patient verbalizes understanding. Questions encouraged and answered to patient's satisfaction.    - semaglutide-weight management 2.4 MG/0.75ML Subcutaneous Solution Auto-injector; Inject 0.75 mL (2.4 mg total) into the skin every 7 days.  Dispense: 9 mL; Refill: 0          Meds & Refills for this Visit:  Requested Prescriptions     Signed Prescriptions Disp Refills    semaglutide-weight management 2.4 MG/0.75ML Subcutaneous Solution Auto-injector 9 mL 0     Sig: Inject 0.75 mL (2.4 mg total) into the skin every 7 days.         Return in about 4 weeks (around 3/21/2025) for Annual wellness visit.  3 months for progress on weight loss.

## 2025-02-22 PROBLEM — E66.09 CLASS 2 OBESITY DUE TO EXCESS CALORIES WITH BODY MASS INDEX (BMI) OF 35.0 TO 35.9 IN ADULT: Status: ACTIVE | Noted: 2025-02-22

## 2025-02-22 PROBLEM — E66.812 CLASS 2 OBESITY DUE TO EXCESS CALORIES WITH BODY MASS INDEX (BMI) OF 35.0 TO 35.9 IN ADULT: Status: ACTIVE | Noted: 2025-02-22

## 2025-03-18 DIAGNOSIS — E66.812 CLASS 2 OBESITY DUE TO EXCESS CALORIES WITH BODY MASS INDEX (BMI) OF 35.0 TO 35.9 IN ADULT, UNSPECIFIED WHETHER SERIOUS COMORBIDITY PRESENT: ICD-10-CM

## 2025-03-18 DIAGNOSIS — Z51.81 THERAPEUTIC DRUG MONITORING: ICD-10-CM

## 2025-03-18 DIAGNOSIS — Z79.899 ENCOUNTER FOR LONG-TERM CURRENT USE OF MEDICATION: ICD-10-CM

## 2025-03-18 DIAGNOSIS — Z71.3 ENCOUNTER FOR WEIGHT LOSS COUNSELING: ICD-10-CM

## 2025-03-18 DIAGNOSIS — E66.09 CLASS 2 OBESITY DUE TO EXCESS CALORIES WITH BODY MASS INDEX (BMI) OF 35.0 TO 35.9 IN ADULT, UNSPECIFIED WHETHER SERIOUS COMORBIDITY PRESENT: ICD-10-CM

## 2025-03-19 NOTE — TELEPHONE ENCOUNTER
Requested Prescriptions     Pending Prescriptions Disp Refills    semaglutide-weight management 2.4 MG/0.75ML Subcutaneous Solution Auto-injector 9 mL 0     Sig: Inject 0.75 mL (2.4 mg total) into the skin every 7 days.       Last Refill: 2/21/25    Last OV: 2/21/25    Next OV: 3/28/25    Per [facility] patient is doing well on this dose. Has been on this dose since June or July of last year. No side effects in the past few months.     Please review and advise.

## 2025-03-19 NOTE — TELEPHONE ENCOUNTER
Refill request is too soon, a prescription for a 3-month supply was sent to her pharmacy on 2/21/2025.  If they did not give her a 3-month supply at the time that she filled the last prescription they should give her 2 more 1 month supply refills.    Also, going forward, if [facility] will not give her a 3-month supply I would recommend going to a different pharmacy if her insurance allows her to do so.

## 2025-03-28 ENCOUNTER — OFFICE VISIT (OUTPATIENT)
Dept: FAMILY MEDICINE CLINIC | Facility: CLINIC | Age: 30
End: 2025-03-28
Payer: COMMERCIAL

## 2025-03-28 VITALS
RESPIRATION RATE: 18 BRPM | WEIGHT: 219.63 LBS | SYSTOLIC BLOOD PRESSURE: 120 MMHG | DIASTOLIC BLOOD PRESSURE: 82 MMHG | BODY MASS INDEX: 34.88 KG/M2 | HEIGHT: 66.5 IN | OXYGEN SATURATION: 98 % | TEMPERATURE: 98 F | HEART RATE: 81 BPM

## 2025-03-28 DIAGNOSIS — Z00.00 ROUTINE GENERAL MEDICAL EXAMINATION AT A HEALTH CARE FACILITY: Primary | ICD-10-CM

## 2025-03-28 PROBLEM — K62.5 RECTAL BLEEDING: Status: RESOLVED | Noted: 2024-04-21 | Resolved: 2025-03-28

## 2025-03-28 PROBLEM — M79.672 HEEL PAIN, BILATERAL: Status: RESOLVED | Noted: 2022-12-01 | Resolved: 2025-03-28

## 2025-03-28 PROBLEM — E55.9 VITAMIN D DEFICIENCY: Status: RESOLVED | Noted: 2024-04-21 | Resolved: 2025-03-28

## 2025-03-28 PROBLEM — M79.671 HEEL PAIN, BILATERAL: Status: RESOLVED | Noted: 2022-12-01 | Resolved: 2025-03-28

## 2025-03-28 PROCEDURE — 99395 PREV VISIT EST AGE 18-39: CPT | Performed by: FAMILY MEDICINE

## 2025-03-28 NOTE — PROGRESS NOTES
Chief Complaint   Patient presents with    Wellness Visit     Patient here for annual physical          HPI:   Deidre Cutler is a 30 year old female       No new complaints.        Symptoms: denies discharge, itching, burning or dysuria, periods are regular.     Last PAP: UTD  Abnormal PAP: no known h/o abnl pap          Wt Readings from Last 6 Encounters:   03/28/25 219 lb 9.6 oz (99.6 kg)   02/21/25 225 lb (102.1 kg)   11/01/24 247 lb (112 kg)   07/18/24 268 lb (121.6 kg)   07/16/24 270 lb (122.5 kg)   04/18/24 289 lb (131.1 kg)     Body mass index is 34.91 kg/m².       Patient Active Problem List   Diagnosis    Insulin resistance    Elevated blood pressure reading without diagnosis of hypertension    Morbid obesity with BMI of 45.0-49.9, adult ([facility])    BRCA gene mutation negative in female    Therapeutic drug monitoring    Encounter for weight loss counseling    Morbid obesity with BMI of 40.0-44.9, adult ([facility])    Encounter for long-term current use of medication    Class 2 obesity due to excess calories with body mass index (BMI) of 35.0 to 35.9 in adult     Current Outpatient Medications   Medication Sig Dispense Refill    semaglutide-weight management 2.4 MG/0.75ML Subcutaneous Solution Auto-injector Inject 0.75 mL (2.4 mg total) into the skin every 7 days. 9 mL 0    propranolol 10 MG Oral Tab         Past Medical History:    Anxiety    Blood in the stool    BRCA gene mutation negative in female    Negative 49 gene hereditary cancer panel, report in media tab    Constipation    Diarrhea, unspecified    Flatulence/gas pain/belching    Heel pain, bilateral    History of depression    Morbid obesity with BMI of 40.0-44.9, adult ([facility])    Morbid obesity with BMI of 45.0-49.9, adult ([facility])    Rectal bleeding    Vitamin D deficiency      History reviewed. No pertinent surgical history.   Family History   Problem Relation Age of Onset    Pancreatic Cancer Father 43    Diabetes Mother     Dementia Maternal  Grandmother     Diabetes Maternal Grandfather     Cancer Maternal Grandfather 53        bladder cancer    Skin cancer Maternal Grandfather         x2 on face    Diabetes Paternal Grandfather     Cancer Paternal Grandfather 61        GE-junction cancer    Prostate Cancer Maternal Uncle     Skin cancer Maternal Cousin Female 34        on toe    Colon Cancer Other         maternal great grandmother      Social History:   Social History     Socioeconomic History    Marital status: Single   Tobacco Use    Smoking status: Never     Passive exposure: Yes    Smokeless tobacco: Never   Vaping Use    Vaping status: Never Used   Substance and Sexual Activity    Alcohol use: Yes     Alcohol/week: 1.0 standard drink of alcohol     Types: 1 Standard drinks or equivalent per week     Comment: 3 drinks per month    Drug use: No    Sexual activity: Not Currently   Other Topics Concern     Service No    Blood Transfusions No    Caffeine Concern Yes     Comment: 2 cans diet coke daily    Occupational Exposure No    Hobby Hazards No    Sleep Concern No    Stress Concern No    Weight Concern No    Special Diet No    Back Care No    Exercise No    Bike Helmet No    Seat Belt Yes    Self-Exams No     Social Drivers of Health      Received from [facility], [facility]    Housing Stability     Occ: works in marketing. Marital Status: single. Children: n/a.   Exercise:  treadmill and walking, plans on doing strength training .    Diet: watches fats closely and watches sugar closely     REVIEW OF SYSTEMS:   GENERAL: Overall feels well  INTEGUMENT: denies any unusual skin lesions or rashes  EYES: denies vision changes  HEENT: denies upper respiratory symptoms  LUNGS: denies cough or shortness of breath with exertion  CHEST:  denies breast changes or pain  CARDIOVASCULAR: denies chest pain or tightness on exertion  VASCULAR: No lower extremity swelling  GI: denies abdominal pain, bowel movement  changes, blood in stool  : No complaint of urinary problems, vaginal discharge or discomfort  MUSCULOSKELETAL: denies joint pain   NEURO: denies headaches or dizziness  PSYCH: denies depression or anxiety  ENDOCRINE: No complaints of temperature intolerance, polyuria, or excessive sweating.  LYMPHATICS: No complaints of swollen glands      EXAM:   /82   Pulse 81   Temp 97.7 °F (36.5 °C)   Resp 18   Ht 5' 6.5\" (1.689 m)   Wt 219 lb 9.6 oz (99.6 kg)   LMP 02/02/2025 (Exact Date)   SpO2 98%   BMI 34.91 kg/m²   Body mass index is 34.91 kg/m².   GENERAL: NAD, Pleasant overweight female.  INTEGUMENT: No visible rashes or suspicious lesions.  HEENT: atraumatic, normocephalic, EACs and TMs clear normal bilaterally.  Nose: No nasal discharge.  OP: MMM.  Posteriorly no exudate or erythema.  EYES: PERRL, EOMI, sclera, conjunctiva are clear  NECK: supple, no adenopathy/thyromegaly/masses  LUNGS: Clear to auscultation bilateral, no rales, rhonchi or wheezing  CARDIO: RRR without murmur normal S1S2  ABD: Obese. Soft, non tender to palpation.  No masses, HSM, or pulsations appreciated.  MUSCULOSKELETAL: gait normal, no gross M/S defect.  EXTREMITIES: no clubbing, cyanosis, or edema  NEURO: Alert and oriented x3.  No gross motor or sensory deficit.  PSYCH: normal affect      Immunization History   Administered Date(s) Administered    Covid-19 Vaccine Pfizer 30 mcg/0.3 ml 01/14/2021, 02/04/2021    DTAP 07/17/1997    DTP 05/10/1997    DTP/HIB Combined 03/11/1997    HEP B 01/13/1997, 02/10/1997    Hib, Unspecified Formulation 05/10/1997, 07/17/1997    OPV 03/11/1997, 05/10/1997, 07/17/1997    TDAP 10/31/2022   Pended Date(s) Pended    Influenza Vaccine Refused 12/01/2022, 11/01/2024       DATA:      Lab Results   Component Value Date    A1C 5.4 04/02/2024    A1C 5.3 09/06/2011     04/02/2024     09/06/2011     Lab Results   Component Value Date    CHOLEST 168 04/02/2024    CHOLEST 164 11/08/2012     CHOLEST 164 11/08/2012     Lab Results   Component Value Date    HDL 41 04/02/2024    HDL 34 (L) 11/08/2012    HDL 34 (L) 11/08/2012     Lab Results   Component Value Date     (H) 04/02/2024     (H) 11/08/2012     (H) 11/08/2012     Lab Results   Component Value Date    TRIG 73 04/02/2024    TRIG 116 (H) 11/08/2012    TRIG 116 (H) 11/08/2012     Lab Results   Component Value Date    AST 9 (L) 04/02/2024    AST 10 (L) 11/08/2012    AST 10 (L) 11/08/2012     Lab Results   Component Value Date    ALT 18 04/02/2024    ALT 17 11/08/2012    ALT 17 11/08/2012           ASSESSMENT AND PLAN:   Deidre Cutler is a 30 year old female who presents for a complete physical exam.        Encounter Diagnosis   Name Primary?    Routine general medical examination at a health care facility Yes         1. Routine general medical examination at a health care facility  Patient provided handout on women's health and prevention.   Routine health profile labs pending.  Recommend healthy diet including green leafy vegetables, fresh fruits and lean protein.  Aerobic exercise for total of 150 minutes/week is recommended for cardiovascular fitness, and 45-60 minutes 6-7 days a week to help obtain weight loss.   Patient has made significant therapeutic lifestyle changes and was encouraged to continue with them.  Age-appropriate calcium and vitamin D intake discussed.    Of note is that patient has had an additional 6 pound weight loss since her last office visit approximately a month ago.    - CBC With Differential With Platelet  - Comp Metabolic Panel (14)  - Lipid Panel  - Assay, Thyroid Stim Hormone  - Free T4, (Free Thyroxine)          Orders Placed This Encounter   Procedures    CBC With Differential With Platelet    Comp Metabolic Panel (14)    Lipid Panel    Assay, Thyroid Stim Hormone    Free T4, (Free Thyroxine)       Return in about 2 months (around 5/28/2025) for Progress on weight loss.  Sooner if  needed.

## 2025-05-17 DIAGNOSIS — E66.812 CLASS 2 OBESITY DUE TO EXCESS CALORIES WITH BODY MASS INDEX (BMI) OF 35.0 TO 35.9 IN ADULT, UNSPECIFIED WHETHER SERIOUS COMORBIDITY PRESENT: ICD-10-CM

## 2025-05-17 DIAGNOSIS — Z79.899 ENCOUNTER FOR LONG-TERM CURRENT USE OF MEDICATION: ICD-10-CM

## 2025-05-17 DIAGNOSIS — Z51.81 THERAPEUTIC DRUG MONITORING: ICD-10-CM

## 2025-05-17 DIAGNOSIS — E66.09 CLASS 2 OBESITY DUE TO EXCESS CALORIES WITH BODY MASS INDEX (BMI) OF 35.0 TO 35.9 IN ADULT, UNSPECIFIED WHETHER SERIOUS COMORBIDITY PRESENT: ICD-10-CM

## 2025-05-17 DIAGNOSIS — Z71.3 ENCOUNTER FOR WEIGHT LOSS COUNSELING: ICD-10-CM

## 2025-05-19 NOTE — TELEPHONE ENCOUNTER
Requested Prescriptions     Pending Prescriptions Disp Refills    semaglutide-weight management 2.4 MG/0.75ML Subcutaneous Solution Auto-injector 9 mL 0     Sig: Inject 0.75 mL (2.4 mg total) into the skin every 7 days.       Last Refill: 2/21    Last OV: 3/28    Next OV: 6/9    Per [facility] patient doing well on this medication. Reports some nausea.

## 2025-06-09 ENCOUNTER — OFFICE VISIT (OUTPATIENT)
Dept: FAMILY MEDICINE CLINIC | Facility: CLINIC | Age: 30
End: 2025-06-09
Payer: COMMERCIAL

## 2025-06-09 VITALS
SYSTOLIC BLOOD PRESSURE: 128 MMHG | HEART RATE: 75 BPM | RESPIRATION RATE: 18 BRPM | DIASTOLIC BLOOD PRESSURE: 84 MMHG | OXYGEN SATURATION: 99 % | TEMPERATURE: 98 F | BODY MASS INDEX: 33.06 KG/M2 | WEIGHT: 208.19 LBS | HEIGHT: 66.5 IN

## 2025-06-09 DIAGNOSIS — E66.811 CLASS 1 OBESITY DUE TO EXCESS CALORIES WITH BODY MASS INDEX (BMI) OF 33.0 TO 33.9 IN ADULT, UNSPECIFIED WHETHER SERIOUS COMORBIDITY PRESENT: ICD-10-CM

## 2025-06-09 DIAGNOSIS — Z71.3 ENCOUNTER FOR WEIGHT LOSS COUNSELING: ICD-10-CM

## 2025-06-09 DIAGNOSIS — E66.09 CLASS 1 OBESITY DUE TO EXCESS CALORIES WITH BODY MASS INDEX (BMI) OF 33.0 TO 33.9 IN ADULT, UNSPECIFIED WHETHER SERIOUS COMORBIDITY PRESENT: ICD-10-CM

## 2025-06-09 DIAGNOSIS — Z51.81 THERAPEUTIC DRUG MONITORING: Primary | ICD-10-CM

## 2025-06-09 DIAGNOSIS — Z79.899 ENCOUNTER FOR LONG-TERM CURRENT USE OF MEDICATION: ICD-10-CM

## 2025-06-09 PROCEDURE — 99213 OFFICE O/P EST LOW 20 MIN: CPT | Performed by: FAMILY MEDICINE

## 2025-06-09 NOTE — PROGRESS NOTES
Deidre Ctuler is a 30 year old female.     Chief Complaint   Patient presents with    Follow - Up    Weight Check     -wegovy         HPI:       Obesity:  In the last 2-3 months no side effects from the Wegovy, patient currently at the 2.4 mg dose.  Occ constipation \"a little bit\". Occ has nausea but that was more so in the beginning, upon rising in the morning and resolves after about 20 mins.  She reports she is drinking at least 64 oz of water per day.  Exercise: still walking. Endorses that she needs to do strength.  Diet:  chicken, red meat, broccoli, peas.      Goal weight per pt she desires around 175#.          Wt Readings from Last 6 Encounters:   06/09/25 208 lb 3.2 oz (94.4 kg)   03/28/25 219 lb 9.6 oz (99.6 kg)   02/21/25 225 lb (102.1 kg)   11/01/24 247 lb (112 kg)   07/18/24 268 lb (121.6 kg)   07/16/24 270 lb (122.5 kg)      Body mass index is 33.1 kg/m².        Current Medications[1]   Past Medical History[2]   Past Surgical History[3]   Social History:    Short Social Hx on File[4]    Family History[5]  REVIEW OF SYSTEMS:   GENERAL HEALTH: Overall feels well.    INTEGUMENT: No complaints of any unusual skin lesions or rashes   RESPIRATORY: Denies: KELLIE/KWOK  CARDIOVASCULAR: Denies CP/palpitations  VASCULAR: Denies LE edema  GI: Denies abdominal pain/vomiting/blood in stool/black stool/diarrhea  NEURO: denies headaches/dizziness    Immunization History   Administered Date(s) Administered    Covid-19 Vaccine Pfizer 30 mcg/0.3 ml 01/14/2021, 02/04/2021    DTAP 07/17/1997    DTP 05/10/1997    DTP/HIB Combined 03/11/1997    HEP B 01/13/1997, 02/10/1997    Hib, Unspecified Formulation 05/10/1997, 07/17/1997    OPV 03/11/1997, 05/10/1997, 07/17/1997    TDAP 10/31/2022   Pended Date(s) Pended    Influenza Vaccine Refused 12/01/2022, 11/01/2024       EXAM:   /84   Pulse 75   Temp 97.5 °F (36.4 °C) (Temporal)   Resp 18   Ht 5' 6.5\" (1.689 m)   Wt 208 lb 3.2 oz (94.4 kg)   LMP 05/23/2025 (Exact  Date)   SpO2 99%   BMI 33.10 kg/m²   GENERAL: NAD, pleasant overweight female  INTEGUMENT: no visible rashes  HEAD: NCAT  VASCULAR: No lower extremity edema  EXTREMITIES: no cyanosis or clubbing  NEURO: Alert and Oriented x3.   PSYCH: Affect normal.  Normal thought content.        DATA:    Lab Results   Component Value Date    A1C 5.4 04/02/2024    A1C 5.3 09/06/2011     04/02/2024     09/06/2011     Lab Results   Component Value Date    CHOLEST 168 04/02/2024    CHOLEST 164 11/08/2012    CHOLEST 164 11/08/2012     Lab Results   Component Value Date    HDL 41 04/02/2024    HDL 34 (L) 11/08/2012    HDL 34 (L) 11/08/2012     Lab Results   Component Value Date     (H) 04/02/2024     (H) 11/08/2012     (H) 11/08/2012     Lab Results   Component Value Date    TRIG 73 04/02/2024    TRIG 116 (H) 11/08/2012    TRIG 116 (H) 11/08/2012     Lab Results   Component Value Date    AST 9 (L) 04/02/2024    AST 10 (L) 11/08/2012    AST 10 (L) 11/08/2012     Lab Results   Component Value Date    ALT 18 04/02/2024    ALT 17 11/08/2012    ALT 17 11/08/2012           ASSESSMENT AND PLAN:       Encounter Diagnoses   Name Primary?    Therapeutic drug monitoring Yes    Encounter for weight loss counseling     Class 1 obesity due to excess calories with body mass index (BMI) of 33.0 to 33.9 in adult, unspecified whether serious comorbidity present     Encounter for long-term current use of medication          1. Therapeutic drug monitoring  2. Encounter for weight loss counseling  3. Class 1 obesity due to excess calories with body mass index (BMI) of 33.0 to 33.9 in adult, unspecified whether serious comorbidity present  Patient with significant weight loss since using Wegovy.  Start date 3/28/2024 with BMI of 46.  BMI today: 33.1  Start weight: 291.  Today's weight: 208  3-month interval weight loss of 17 pounds.  Weight loss to date: 83 pounds.  Patient has tentatively set her goal weight 475  pounds.    Recommend continue Wegovy as below.  Follow-up in 3 months, sooner if needed.    - semaglutide-weight management 2.4 MG/0.75ML Subcutaneous Solution Auto-injector; Inject 0.75 mL (2.4 mg total) into the skin every 7 days.  Dispense: 9 mL; Refill: 0    4. Encounter for long-term current use of medication  Medication use, risks, benefits, side effects and precautions discussed, patient verbalizes understanding. Questions encouraged and answered to patient's satisfaction.    - semaglutide-weight management 2.4 MG/0.75ML Subcutaneous Solution Auto-injector; Inject 0.75 mL (2.4 mg total) into the skin every 7 days.  Dispense: 9 mL; Refill: 0        Meds & Refills for this Visit:  Requested Prescriptions     Signed Prescriptions Disp Refills    semaglutide-weight management 2.4 MG/0.75ML Subcutaneous Solution Auto-injector 9 mL 0     Sig: Inject 0.75 mL (2.4 mg total) into the skin every 7 days.         Return in about 3 months (around 9/9/2025) for Weight management and use of Wegovy.  Sooner if needed.           [1]   Current Outpatient Medications   Medication Sig Dispense Refill    semaglutide-weight management 2.4 MG/0.75ML Subcutaneous Solution Auto-injector Inject 0.75 mL (2.4 mg total) into the skin every 7 days. 9 mL 0    propranolol 10 MG Oral Tab      [2]   Past Medical History:   Anxiety    Blood in the stool    BRCA gene mutation negative in female    Negative 49 gene hereditary cancer panel, report in media tab    Constipation    Diarrhea, unspecified    Flatulence/gas pain/belching    Heel pain, bilateral    History of depression    Morbid obesity with BMI of 40.0-44.9, adult (HCC)    Morbid obesity with BMI of 45.0-49.9, adult (HCC)    Rectal bleeding    Vitamin D deficiency   [3] History reviewed. No pertinent surgical history.  [4]   Social History  Socioeconomic History    Marital status: Single   Tobacco Use    Smoking status: Never     Passive exposure: Yes    Smokeless tobacco: Never    Vaping Use    Vaping status: Never Used   Substance and Sexual Activity    Alcohol use: Yes     Alcohol/week: 1.0 standard drink of alcohol     Types: 1 Standard drinks or equivalent per week     Comment: 3 drinks per month    Drug use: No    Sexual activity: Not Currently   Other Topics Concern     Service No    Blood Transfusions No    Caffeine Concern Yes     Comment: 2 cans diet coke daily    Occupational Exposure No    Hobby Hazards No    Sleep Concern No    Stress Concern No    Weight Concern No    Special Diet No    Back Care No    Exercise No    Bike Helmet No    Seat Belt Yes    Self-Exams No     Social Drivers of Health     Food Insecurity: No Food Insecurity (6/9/2025)    NCSS - Food Insecurity     Worried About Running Out of Food in the Last Year: No     Ran Out of Food in the Last Year: No   Transportation Needs: No Transportation Needs (6/9/2025)    NCSS - Transportation     Lack of Transportation: No   Housing Stability: Not At Risk (6/9/2025)    NCSS - Housing/Utilities     Has Housing: Yes     Worried About Losing Housing: No     Unable to Get Utilities: No   [5]   Family History  Problem Relation Age of Onset    Pancreatic Cancer Father 43    Diabetes Mother     Dementia Maternal Grandmother     Diabetes Maternal Grandfather     Cancer Maternal Grandfather 53        bladder cancer    Skin cancer Maternal Grandfather         x2 on face    Diabetes Paternal Grandfather     Cancer Paternal Grandfather 61        GE-junction cancer    Prostate Cancer Maternal Uncle     Skin cancer Maternal Cousin Female 34        on toe    Colon Cancer Other         maternal great grandmother

## 2025-06-10 LAB
ABSOLUTE BASOPHILS: 52 CELLS/UL (ref 0–200)
ABSOLUTE EOSINOPHILS: 52 CELLS/UL (ref 15–500)
ABSOLUTE LYMPHOCYTES: 1769 CELLS/UL (ref 850–3900)
ABSOLUTE MONOCYTES: 659 CELLS/UL (ref 200–950)
ABSOLUTE NEUTROPHILS: 4869 CELLS/UL (ref 1500–7800)
ALBUMIN/GLOBULIN RATIO: 1.6 (CALC) (ref 1–2.5)
ALBUMIN: 4.7 G/DL (ref 3.6–5.1)
ALKALINE PHOSPHATASE: 57 U/L (ref 31–125)
ALT: 7 U/L (ref 6–29)
AST: 11 U/L (ref 10–30)
BASOPHILS: 0.7 %
BILIRUBIN, TOTAL: 0.7 MG/DL (ref 0.2–1.2)
BUN: 8 MG/DL (ref 7–25)
CALCIUM: 10 MG/DL (ref 8.6–10.2)
CARBON DIOXIDE: 25 MMOL/L (ref 20–32)
CHLORIDE: 102 MMOL/L (ref 98–110)
CHOL/HDLC RATIO: 4.1 (CALC)
CHOLESTEROL, TOTAL: 165 MG/DL
CREATININE: 0.68 MG/DL (ref 0.5–0.96)
EGFR: 122 ML/MIN/1.73M2
EOSINOPHILS: 0.7 %
GLOBULIN: 3 G/DL (CALC) (ref 1.9–3.7)
GLUCOSE: 82 MG/DL (ref 65–99)
HDL CHOLESTEROL: 40 MG/DL
HEMATOCRIT: 46.6 % (ref 35–45)
HEMOGLOBIN: 15 G/DL (ref 11.7–15.5)
LDL-CHOLESTEROL: 108 MG/DL (CALC)
LYMPHOCYTES: 23.9 %
MCH: 30.3 PG (ref 27–33)
MCHC: 32.2 G/DL (ref 32–36)
MCV: 94.1 FL (ref 80–100)
MONOCYTES: 8.9 %
MPV: 9.9 FL (ref 7.5–12.5)
NEUTROPHILS: 65.8 %
NON-HDL CHOLESTEROL: 125 MG/DL (CALC)
PLATELET COUNT: 566 THOUSAND/UL (ref 140–400)
POTASSIUM: 4.3 MMOL/L (ref 3.5–5.3)
PROTEIN, TOTAL: 7.7 G/DL (ref 6.1–8.1)
RDW: 12 % (ref 11–15)
RED BLOOD CELL COUNT: 4.95 MILLION/UL (ref 3.8–5.1)
SODIUM: 137 MMOL/L (ref 135–146)
T4, FREE: 1.2 NG/DL (ref 0.8–1.8)
TRIGLYCERIDES: 82 MG/DL
TSH: 0.44 MIU/L
WHITE BLOOD CELL COUNT: 7.4 THOUSAND/UL (ref 3.8–10.8)

## (undated) NOTE — LETTER
Date & Time: 11/5/2019, 2:16 PM  Patient: Larance Sandhoff  Encounter Provider(s):    OLAYINKA Almanzar       To Whom It May Concern:    Ab Bello was seen and treated in our department on 11/5/2019.    Please excuse her from class on 11/5/201

## (undated) NOTE — LETTER
Date & Time: 11/5/2019, 2:04 PM  Patient: Emmanuelle Ortiz  Encounter Provider(s):    OLAYINKA Granger       To Whom It May Concern:    Gisela Zaragoza was seen and treated in our department on 11/5/2019.  She can return to work with these [address] [place] [address] Avenue

## (undated) NOTE — LETTER
Date & Time: 11/5/2019, 2:17 PM  Patient: Carlos Alberto Bird  Encounter Provider(s):    OLAYINKA Iglesias       To Whom It May Concern:    Catalino Brumfield was seen and treated in our department on 11/5/2019.   She may return to work - please allow for